# Patient Record
Sex: MALE | Race: WHITE
[De-identification: names, ages, dates, MRNs, and addresses within clinical notes are randomized per-mention and may not be internally consistent; named-entity substitution may affect disease eponyms.]

---

## 2020-01-08 PROBLEM — Z00.00 ENCOUNTER FOR PREVENTIVE HEALTH EXAMINATION: Status: ACTIVE | Noted: 2020-01-08

## 2020-01-09 ENCOUNTER — APPOINTMENT (OUTPATIENT)
Dept: NEUROLOGY | Facility: CLINIC | Age: 30
End: 2020-01-09
Payer: COMMERCIAL

## 2020-01-09 VITALS
SYSTOLIC BLOOD PRESSURE: 130 MMHG | HEIGHT: 69 IN | BODY MASS INDEX: 27.4 KG/M2 | HEART RATE: 64 BPM | WEIGHT: 185 LBS | OXYGEN SATURATION: 98 % | DIASTOLIC BLOOD PRESSURE: 80 MMHG | TEMPERATURE: 98.5 F

## 2020-01-09 DIAGNOSIS — Z82.3 FAMILY HISTORY OF STROKE: ICD-10-CM

## 2020-01-09 DIAGNOSIS — Z82.49 FAMILY HISTORY OF ISCHEMIC HEART DISEASE AND OTHER DISEASES OF THE CIRCULATORY SYSTEM: ICD-10-CM

## 2020-01-09 DIAGNOSIS — Z83.3 FAMILY HISTORY OF DIABETES MELLITUS: ICD-10-CM

## 2020-01-09 DIAGNOSIS — Z78.9 OTHER SPECIFIED HEALTH STATUS: ICD-10-CM

## 2020-01-09 PROCEDURE — 99204 OFFICE O/P NEW MOD 45 MIN: CPT

## 2020-01-09 NOTE — REVIEW OF SYSTEMS
[Confused or Disoriented] : confusion [Fever] : no fever [Chills] : no chills [Feeling Poorly] : not feeling poorly [Feeling Tired] : not feeling tired [Suicidal] : not suicidal [Sleep Disturbances] : no sleep disturbances [Anxiety] : no anxiety [Memory Lapses or Loss] : no memory loss [Depression] : no depression [Difficulty with Language] : no ~M difficulty with language [Changed Thought Patterns] : no change in thought patterns [Repeating Questions] : no repeated questioning about recent events [Facial Weakness] : no facial weakness [Arm Weakness] : no arm weakness [Hand Weakness] : no hand weakness [Leg Weakness] : no leg weakness [Poor Coordination] : good coordination [Difficulties in Speech] : no speech difficulties [Difficulty Writing] : no difficulty writing [Numbness] : no numbness [Tingling] : no tingling [Abnormal Sensation] : no abnormal sensation [Hypersensitivity] : no hypersensitivity [Cluster Headache] : no cluster headache [Migraine Headache] : no migraine headache [Tension Headache] : no tension-type headache [Difficulty Walking] : no difficulty walking [Inability to Walk] : able to walk [Ataxia] : no ataxia [Frequent Falls] : not falling [Limping] : not limping [Eye Pain] : no eye pain [Eyesight Problems] : no eyesight problems [Discharge From Eyes] : no purulent discharge from the eyes [Dry Eyes] : no dryness of the eyes [Eyes Itch] : no itching of the eyes [Earache] : no earache [Nosebleeds] : no nosebleeds [Loss Of Hearing] : no hearing loss [Nasal Discharge] : no nasal discharge [Abdominal Pain] : no abdominal pain [Vomiting] : no vomiting [Constipation] : no constipation [Dysuria] : no dysuria [Diarrhea] : no diarrhea [Arthralgias] : no arthralgias [Skin Lesions] : no skin lesions [Joint Pain] : no joint pain [Itching] : no itching [Skin Wound] : no skin wound [Change In A Mole] : no change in a mole [Proptosis] : no proptosis [Hot Flashes] : no hot flashes [Erectile Dysfunction] : no erectile dysfunction [Muscle Weakness] : no muscle weakness [Easy Bleeding] : no tendency for easy bleeding [Swollen Glands] : no swollen glands [Swollen Glands In The Neck] : no swollen glands in the neck

## 2020-01-09 NOTE — ASSESSMENT
[FreeTextEntry1] : KAYLIE MORRISON is a 29 year old M with no significant past medical history is here to be evaluated for episodes of tachycardia and confusional state and occasional chewing and lip smacking. Exam is unremarkable. There is no clear symptoms of seizure with no LOC, shaking, staring, tongue biting or incontinence, however seizure is still in the differential. Today we discussed the options and further studies. One option is admission to the hospital and VEEG monitoring. The first step however is to r/o cardiac event. He has an appointment with cardiologist next week. Meanwhile I would start the work up with routine EEG and CT head. \par \par - REEG\par - CT head w/o contrast \par - FU in 2-3 weeks after cardiac work up and EEG \par

## 2020-01-09 NOTE — PHYSICAL EXAM
[FreeTextEntry1] : Mental status: Awake, alert and oriented x3.  Recent and remote memory intact.  Naming, repetition and comprehension intact.  Attention/concentration intact.  No dysarthria, no aphasia.  Fund of knowledge appropriate.  \par Cranial nerves: Pupils equally round and reactive to light, visual fields full, no nystagmus, extraocular muscles intact, V1 through V3 intact bilaterally and symmetric, face symmetric, hearing intact to finger rub, palate elevation symmetric, tongue was midline.\par Motor:  MRC grading 5/5 b/l UE/LE.   strength 5/5.  Normal tone and bulk.  No abnormal movements.  \par Sensation: Intact to light touch, proprioception, and pinprick. \par Coordination: No dysmetria on finger-to-nose and heel-to-shin.  No dysdiadokinesia.\par Reflexes: 2+ in bilateral UE/LE, downgoing toes bilaterally. (-) Khanna.\par Gait: Narrow and steady. No ataxia.  Romberg negative\par \par

## 2020-01-09 NOTE — HISTORY OF PRESENT ILLNESS
[FreeTextEntry1] : KAYLIE MORRISON is a 29 year old M with no significant medical history who is here to be evaluated for episodes of tachycardia and confusion. He reports starting of the symptoms about a year ago. They were less frequent in the beginning and now happens 2-3 times a day. During the episodes he feels that his heart races fast for 5 seconds. During the intense episodes he becomes confused as he can not remember the location and his tasks for hew seconds. During some episodes his friends notices that he does chewing and lip smacking. He denies LOC, eye fluttering, body shaking or incontinence. He denies any history of head trauma, seizure in the past or seizure in the family. He works as a  and denies significant stress, depressed mood or poor sleep. He denies headache, dizziness, arm and leg weakness or numbness.  \par

## 2020-01-14 ENCOUNTER — APPOINTMENT (OUTPATIENT)
Dept: NEUROLOGY | Facility: CLINIC | Age: 30
End: 2020-01-14
Payer: COMMERCIAL

## 2020-01-14 PROCEDURE — 95816 EEG AWAKE AND DROWSY: CPT

## 2020-01-21 ENCOUNTER — OUTPATIENT (OUTPATIENT)
Dept: OUTPATIENT SERVICES | Facility: HOSPITAL | Age: 30
LOS: 1 days | Discharge: HOME | End: 2020-01-21
Payer: COMMERCIAL

## 2020-01-21 DIAGNOSIS — F44.89 OTHER DISSOCIATIVE AND CONVERSION DISORDERS: ICD-10-CM

## 2020-01-21 PROCEDURE — 70450 CT HEAD/BRAIN W/O DYE: CPT | Mod: 26

## 2020-02-11 ENCOUNTER — APPOINTMENT (OUTPATIENT)
Dept: NEUROLOGY | Facility: CLINIC | Age: 30
End: 2020-02-11
Payer: COMMERCIAL

## 2020-02-11 VITALS
TEMPERATURE: 98 F | WEIGHT: 184 LBS | HEART RATE: 62 BPM | DIASTOLIC BLOOD PRESSURE: 79 MMHG | SYSTOLIC BLOOD PRESSURE: 137 MMHG | HEIGHT: 69 IN | BODY MASS INDEX: 27.25 KG/M2 | OXYGEN SATURATION: 99 %

## 2020-02-11 DIAGNOSIS — R00.0 TACHYCARDIA, UNSPECIFIED: ICD-10-CM

## 2020-02-11 DIAGNOSIS — F44.89 OTHER DISSOCIATIVE AND CONVERSION DISORDERS: ICD-10-CM

## 2020-02-11 PROCEDURE — 99214 OFFICE O/P EST MOD 30 MIN: CPT

## 2020-02-11 NOTE — REVIEW OF SYSTEMS
[Fever] : no fever [Chills] : no chills [Feeling Poorly] : not feeling poorly [Feeling Tired] : not feeling tired [Suicidal] : not suicidal [Sleep Disturbances] : no sleep disturbances [Change In Personality] : no personality change [Anxiety] : anxiety [Emotional Problems] : emotional problems [Confused or Disoriented] : no confusion [Decr. Concentrating Ability] : no decrease in concentrating ability [Difficulty with Language] : no ~M difficulty with language [Memory Lapses or Loss] : memory loss [Changed Thought Patterns] : no change in thought patterns [Facial Weakness] : no facial weakness [Repeating Questions] : no repeated questioning about recent events [Arm Weakness] : no arm weakness [Hand Weakness] : no hand weakness [Leg Weakness] : no leg weakness [Poor Coordination] : good coordination [Difficulty Writing] : no difficulty writing [Difficulties in Speech] : no speech difficulties [Numbness] : no numbness [Tingling] : no tingling [Abnormal Sensation] : no abnormal sensation [Dizziness] : dizziness [Seizures] : no convulsions [Cluster Headache] : no cluster headache [Fainting] : fainting [Tension Headache] : no tension-type headache [Difficulty Walking] : no difficulty walking [Inability to Walk] : able to walk [Ataxia] : no ataxia [Frequent Falls] : not falling [Eye Pain] : no eye pain [Limping] : not limping [Red Eyes] : eyes not red [Heart Rate Is Fast] : fast heart rate [Heart Rate Is Slow] : the heart rate was not slow [Palpitations] : palpitations [Chest Pain] : chest pain [Wheezing] : no wheezing [Shortness Of Breath] : no shortness of breath [Abdominal Pain] : no abdominal pain [Dysuria] : no dysuria [Incontinence] : no incontinence [Hesitancy] : no urinary hesitancy [Arthralgias] : no arthralgias [Nocturia] : no nocturia [Joint Pain] : no joint pain [Skin Lesions] : no skin lesions

## 2020-02-11 NOTE — HISTORY OF PRESENT ILLNESS
[FreeTextEntry1] : KAYLIE MORRISON is a 29 year old M is here as a follow up visit for confusional state. He started to get  episodes of tachycardia and confusion about 3-4 times per day since last year. During the episodes he feels that his heart races fast for 5 seconds. During the intense episodes he becomes confused as he can not remember the location and his tasks for hew seconds. During some episodes his friends notices that he does chewing and lip smacking and has hand shaking. He denies LOC, eye fluttering, body shaking or incontinence. He denies any history of head trauma, seizure in the past or seizure in the family. Last visit routine EEG was ordered which was normal. CT head was unremarkable. He was seen by a cardiologist and reports normal cardiac work up. He still gets the episodes for about 3-4 times a day mostly early in the morning. He denies any triggers but sometimes stress makes it worse. \par

## 2020-02-11 NOTE — ASSESSMENT
[FreeTextEntry1] : KAYLIE MORRISON is a 29 year old M is here as a follow up for confusional states. He still gets the episodes for about 3-4 times a day. The episodes are described as palpitation, chest pain, sweating and sometimes confusion, shaking, lip smacking and chewing. Routine EEG and CT head are normal. The symptoms are suggestive of panic attack but to totally r/o the seizure will schedule him to ambulatory EEG. Meanwhile I refer him to psychiatry to assess the symptoms. \par \par - 72 hours ambulatory EEG  \par - Psychiatry referral\par - RTC after the results  \par

## 2020-02-21 ENCOUNTER — APPOINTMENT (OUTPATIENT)
Dept: NEUROLOGY | Facility: CLINIC | Age: 30
End: 2020-02-21

## 2020-02-24 ENCOUNTER — APPOINTMENT (OUTPATIENT)
Dept: NEUROLOGY | Facility: CLINIC | Age: 30
End: 2020-02-24

## 2020-02-28 ENCOUNTER — APPOINTMENT (OUTPATIENT)
Dept: NEUROLOGY | Facility: CLINIC | Age: 30
End: 2020-02-28
Payer: COMMERCIAL

## 2020-02-29 PROCEDURE — 95708 EEG WO VID EA 12-26HR UNMNTR: CPT

## 2020-03-01 PROCEDURE — 95708 EEG WO VID EA 12-26HR UNMNTR: CPT

## 2020-03-02 ENCOUNTER — APPOINTMENT (OUTPATIENT)
Dept: NEUROLOGY | Facility: CLINIC | Age: 30
End: 2020-03-02

## 2020-03-02 ENCOUNTER — TRANSCRIPTION ENCOUNTER (OUTPATIENT)
Age: 30
End: 2020-03-02

## 2020-03-02 PROCEDURE — 95708 EEG WO VID EA 12-26HR UNMNTR: CPT

## 2020-03-02 PROCEDURE — 95700 EEG CONT REC W/VID EEG TECH: CPT

## 2020-03-02 PROCEDURE — 95723 EEG PHY/QHP>60<84 HR W/O VID: CPT

## 2020-03-03 ENCOUNTER — APPOINTMENT (OUTPATIENT)
Dept: NEUROLOGY | Facility: CLINIC | Age: 30
End: 2020-03-03

## 2020-03-06 RX ORDER — LEVETIRACETAM 500 MG/1
500 TABLET, FILM COATED ORAL TWICE DAILY
Qty: 180 | Refills: 3 | Status: ACTIVE | COMMUNITY
Start: 2020-03-06 | End: 1900-01-01

## 2020-03-22 ENCOUNTER — RESULT REVIEW (OUTPATIENT)
Age: 30
End: 2020-03-22

## 2020-03-22 ENCOUNTER — OUTPATIENT (OUTPATIENT)
Dept: OUTPATIENT SERVICES | Facility: HOSPITAL | Age: 30
LOS: 1 days | Discharge: HOME | End: 2020-03-22
Payer: COMMERCIAL

## 2020-03-22 DIAGNOSIS — G40.109 LOCALIZATION-RELATED (FOCAL) (PARTIAL) SYMPTOMATIC EPILEPSY AND EPILEPTIC SYNDROMES WITH SIMPLE PARTIAL SEIZURES, NOT INTRACTABLE, WITHOUT STATUS EPILEPTICUS: ICD-10-CM

## 2020-03-22 PROCEDURE — 70553 MRI BRAIN STEM W/O & W/DYE: CPT | Mod: 26

## 2020-03-25 ENCOUNTER — APPOINTMENT (OUTPATIENT)
Dept: NEUROSURGERY | Facility: CLINIC | Age: 30
End: 2020-03-25
Payer: COMMERCIAL

## 2020-03-25 VITALS — BODY MASS INDEX: 27.7 KG/M2 | WEIGHT: 187 LBS | HEIGHT: 69 IN

## 2020-03-25 PROCEDURE — 99204 OFFICE O/P NEW MOD 45 MIN: CPT

## 2020-03-26 NOTE — HISTORY OF PRESENT ILLNESS
[FreeTextEntry1] : seizures [de-identified] : Mr. Nickerson is a komal 29 year-old right handed male with a history of seizures that started 3 years ago.  At the time, he describes the seizures as mild and infrequent.  He would get olfactory hallucinations then palpitations which he thought were panic attacks at the time.  For the past 3-4 months he has had the same episodes but he has no recollection of them.  They are more intense and more frequent, 3-4 times per day.  EEG confirmed seizure activity.  He was started on keppra, currently raised to 750mg bid 5 days ago.  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

## 2020-03-26 NOTE — PLAN
[FreeTextEntry1] : I discussed the MRI and clinical findings with Mr. Nickerson in detail.  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  Less likely malignant transformation of LGG.  I recommend an MRI brain with and without contrast with brainlab protocol in 4 weeks.  We will have a comparison study.  Nevertheless, I believe Mr. Nickerson should have surgery.  Resection would give us a pathologic diagnosis as well as potentially cure him of seizures.  However, a follow-up scan will give us an idea of its behavior and therefore the urgency.

## 2020-04-27 ENCOUNTER — RESULT REVIEW (OUTPATIENT)
Age: 30
End: 2020-04-27

## 2020-04-27 ENCOUNTER — OUTPATIENT (OUTPATIENT)
Dept: OUTPATIENT SERVICES | Facility: HOSPITAL | Age: 30
LOS: 1 days | Discharge: HOME | End: 2020-04-27
Payer: COMMERCIAL

## 2020-04-27 DIAGNOSIS — G93.89 OTHER SPECIFIED DISORDERS OF BRAIN: ICD-10-CM

## 2020-04-27 PROCEDURE — 70553 MRI BRAIN STEM W/O & W/DYE: CPT | Mod: 26

## 2020-04-29 ENCOUNTER — APPOINTMENT (OUTPATIENT)
Dept: NEUROSURGERY | Facility: CLINIC | Age: 30
End: 2020-04-29
Payer: COMMERCIAL

## 2020-04-29 PROCEDURE — 99214 OFFICE O/P EST MOD 30 MIN: CPT

## 2020-04-29 NOTE — HISTORY OF PRESENT ILLNESS
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 \par \par In the interim, Kulwinder had a 4 week interval MRI which showed no change.  He also had his keppra increased to 1000mg bid.  He is still having breakthrough seizures.

## 2020-04-29 NOTE — ASSESSMENT
[FreeTextEntry1] : I discussed the imaging and clinical findings with Kulwinder in detail.  I believe he should have this temporal lobe lesion removed, with the hope that it would treat his seizures and he would not require as high doses of antiepileptics or none at all.  I recommend a right pterional craniotomy and resection.  I discussed risks and benefits at length and he wishes to proceed.

## 2020-05-07 ENCOUNTER — APPOINTMENT (OUTPATIENT)
Dept: NEUROLOGY | Facility: CLINIC | Age: 30
End: 2020-05-07

## 2020-05-29 ENCOUNTER — OUTPATIENT (OUTPATIENT)
Dept: OUTPATIENT SERVICES | Facility: HOSPITAL | Age: 30
LOS: 1 days | Discharge: HOME | End: 2020-05-29
Payer: COMMERCIAL

## 2020-05-29 ENCOUNTER — RESULT REVIEW (OUTPATIENT)
Age: 30
End: 2020-05-29

## 2020-05-29 VITALS
WEIGHT: 191.8 LBS | TEMPERATURE: 97 F | RESPIRATION RATE: 16 BRPM | DIASTOLIC BLOOD PRESSURE: 78 MMHG | SYSTOLIC BLOOD PRESSURE: 137 MMHG | OXYGEN SATURATION: 97 % | HEIGHT: 69 IN | HEART RATE: 64 BPM

## 2020-05-29 DIAGNOSIS — G93.89 OTHER SPECIFIED DISORDERS OF BRAIN: ICD-10-CM

## 2020-05-29 DIAGNOSIS — Z01.818 ENCOUNTER FOR OTHER PREPROCEDURAL EXAMINATION: ICD-10-CM

## 2020-05-29 LAB
ALBUMIN SERPL ELPH-MCNC: 4.9 G/DL — SIGNIFICANT CHANGE UP (ref 3.5–5.2)
ALP SERPL-CCNC: 68 U/L — SIGNIFICANT CHANGE UP (ref 30–115)
ALT FLD-CCNC: 34 U/L — SIGNIFICANT CHANGE UP (ref 0–41)
ANION GAP SERPL CALC-SCNC: 12 MMOL/L — SIGNIFICANT CHANGE UP (ref 7–14)
APTT BLD: 31.2 SEC — SIGNIFICANT CHANGE UP (ref 27–39.2)
AST SERPL-CCNC: 22 U/L — SIGNIFICANT CHANGE UP (ref 0–41)
BASOPHILS # BLD AUTO: 0.04 K/UL — SIGNIFICANT CHANGE UP (ref 0–0.2)
BASOPHILS NFR BLD AUTO: 0.8 % — SIGNIFICANT CHANGE UP (ref 0–1)
BILIRUB SERPL-MCNC: 0.3 MG/DL — SIGNIFICANT CHANGE UP (ref 0.2–1.2)
BLD GP AB SCN SERPL QL: SIGNIFICANT CHANGE UP
BUN SERPL-MCNC: 12 MG/DL — SIGNIFICANT CHANGE UP (ref 10–20)
CALCIUM SERPL-MCNC: 9.5 MG/DL — SIGNIFICANT CHANGE UP (ref 8.5–10.1)
CHLORIDE SERPL-SCNC: 102 MMOL/L — SIGNIFICANT CHANGE UP (ref 98–110)
CO2 SERPL-SCNC: 28 MMOL/L — SIGNIFICANT CHANGE UP (ref 17–32)
CREAT SERPL-MCNC: 0.9 MG/DL — SIGNIFICANT CHANGE UP (ref 0.7–1.5)
EOSINOPHIL # BLD AUTO: 0.07 K/UL — SIGNIFICANT CHANGE UP (ref 0–0.7)
EOSINOPHIL NFR BLD AUTO: 1.5 % — SIGNIFICANT CHANGE UP (ref 0–8)
GLUCOSE SERPL-MCNC: 80 MG/DL — SIGNIFICANT CHANGE UP (ref 70–99)
HCT VFR BLD CALC: 47.3 % — SIGNIFICANT CHANGE UP (ref 42–52)
HGB BLD-MCNC: 16.2 G/DL — SIGNIFICANT CHANGE UP (ref 14–18)
IMM GRANULOCYTES NFR BLD AUTO: 0.4 % — HIGH (ref 0.1–0.3)
INR BLD: 0.96 RATIO — SIGNIFICANT CHANGE UP (ref 0.65–1.3)
LYMPHOCYTES # BLD AUTO: 1.54 K/UL — SIGNIFICANT CHANGE UP (ref 1.2–3.4)
LYMPHOCYTES # BLD AUTO: 32.2 % — SIGNIFICANT CHANGE UP (ref 20.5–51.1)
MCHC RBC-ENTMCNC: 28.6 PG — SIGNIFICANT CHANGE UP (ref 27–31)
MCHC RBC-ENTMCNC: 34.2 G/DL — SIGNIFICANT CHANGE UP (ref 32–37)
MCV RBC AUTO: 83.4 FL — SIGNIFICANT CHANGE UP (ref 80–94)
MONOCYTES # BLD AUTO: 0.3 K/UL — SIGNIFICANT CHANGE UP (ref 0.1–0.6)
MONOCYTES NFR BLD AUTO: 6.3 % — SIGNIFICANT CHANGE UP (ref 1.7–9.3)
NEUTROPHILS # BLD AUTO: 2.81 K/UL — SIGNIFICANT CHANGE UP (ref 1.4–6.5)
NEUTROPHILS NFR BLD AUTO: 58.8 % — SIGNIFICANT CHANGE UP (ref 42.2–75.2)
NRBC # BLD: 0 /100 WBCS — SIGNIFICANT CHANGE UP (ref 0–0)
PLATELET # BLD AUTO: 259 K/UL — SIGNIFICANT CHANGE UP (ref 130–400)
POTASSIUM SERPL-MCNC: 4.7 MMOL/L — SIGNIFICANT CHANGE UP (ref 3.5–5)
POTASSIUM SERPL-SCNC: 4.7 MMOL/L — SIGNIFICANT CHANGE UP (ref 3.5–5)
PROT SERPL-MCNC: 7.5 G/DL — SIGNIFICANT CHANGE UP (ref 6–8)
PROTHROM AB SERPL-ACNC: 11 SEC — SIGNIFICANT CHANGE UP (ref 9.95–12.87)
RBC # BLD: 5.67 M/UL — SIGNIFICANT CHANGE UP (ref 4.7–6.1)
RBC # FLD: 12.6 % — SIGNIFICANT CHANGE UP (ref 11.5–14.5)
SODIUM SERPL-SCNC: 142 MMOL/L — SIGNIFICANT CHANGE UP (ref 135–146)
WBC # BLD: 4.78 K/UL — LOW (ref 4.8–10.8)
WBC # FLD AUTO: 4.78 K/UL — LOW (ref 4.8–10.8)

## 2020-05-29 PROCEDURE — 71046 X-RAY EXAM CHEST 2 VIEWS: CPT | Mod: 26

## 2020-05-29 NOTE — H&P PST ADULT - REASON FOR ADMISSION
29 yo male presents for PAST in preparation for right pterional craniotomy removal of mass on 6/5/2020

## 2020-05-29 NOTE — H&P PST ADULT - HISTORY OF PRESENT ILLNESS
Pt has brain mass that is causing seizures. Denies any chest pain, difficulty breathing, SOB, palpitations, dysuria, URI, or any other infections in the last 2 weeks. Denies any recent travel, contact, or exposure to any persons with known or suspected COVID-19. Pt states he had COVID antibody testing done within the last 2 weeks which came back positive but denies experiencing any symptoms. Exercise tolerance of 3-4 flights of stairs without dyspnea. YARELY reviewed with patient.

## 2020-06-01 PROBLEM — D49.6 NEOPLASM OF UNSPECIFIED BEHAVIOR OF BRAIN: Chronic | Status: ACTIVE | Noted: 2020-05-29

## 2020-06-01 PROBLEM — R56.9 UNSPECIFIED CONVULSIONS: Chronic | Status: ACTIVE | Noted: 2020-05-29

## 2020-06-04 ENCOUNTER — APPOINTMENT (OUTPATIENT)
Dept: NEUROLOGY | Facility: CLINIC | Age: 30
End: 2020-06-04

## 2020-06-13 ENCOUNTER — LABORATORY RESULT (OUTPATIENT)
Age: 30
End: 2020-06-13

## 2020-06-16 ENCOUNTER — RESULT REVIEW (OUTPATIENT)
Age: 30
End: 2020-06-16

## 2020-06-16 ENCOUNTER — INPATIENT (INPATIENT)
Facility: HOSPITAL | Age: 30
LOS: 1 days | Discharge: HOME HEALTH | End: 2020-06-18
Attending: NEUROLOGICAL SURGERY | Admitting: NEUROLOGICAL SURGERY
Payer: COMMERCIAL

## 2020-06-16 VITALS
HEART RATE: 79 BPM | TEMPERATURE: 98 F | DIASTOLIC BLOOD PRESSURE: 72 MMHG | RESPIRATION RATE: 20 BRPM | SYSTOLIC BLOOD PRESSURE: 129 MMHG

## 2020-06-16 DIAGNOSIS — G40.919 EPILEPSY, UNSPECIFIED, INTRACTABLE, WITHOUT STATUS EPILEPTICUS: ICD-10-CM

## 2020-06-16 LAB
ALBUMIN SERPL ELPH-MCNC: 4 G/DL — SIGNIFICANT CHANGE UP (ref 3.5–5.2)
ALP SERPL-CCNC: 56 U/L — SIGNIFICANT CHANGE UP (ref 30–115)
ALT FLD-CCNC: 27 U/L — SIGNIFICANT CHANGE UP (ref 0–41)
ANION GAP SERPL CALC-SCNC: 12 MMOL/L — SIGNIFICANT CHANGE UP (ref 7–14)
APTT BLD: 29.3 SEC — SIGNIFICANT CHANGE UP (ref 27–39.2)
AST SERPL-CCNC: 20 U/L — SIGNIFICANT CHANGE UP (ref 0–41)
BASOPHILS # BLD AUTO: 0.01 K/UL — SIGNIFICANT CHANGE UP (ref 0–0.2)
BASOPHILS # BLD AUTO: 0.01 K/UL — SIGNIFICANT CHANGE UP (ref 0–0.2)
BASOPHILS NFR BLD AUTO: 0.1 % — SIGNIFICANT CHANGE UP (ref 0–1)
BASOPHILS NFR BLD AUTO: 0.1 % — SIGNIFICANT CHANGE UP (ref 0–1)
BILIRUB SERPL-MCNC: 0.4 MG/DL — SIGNIFICANT CHANGE UP (ref 0.2–1.2)
BLD GP AB SCN SERPL QL: SIGNIFICANT CHANGE UP
BUN SERPL-MCNC: 11 MG/DL — SIGNIFICANT CHANGE UP (ref 10–20)
CALCIUM SERPL-MCNC: 8.1 MG/DL — LOW (ref 8.5–10.1)
CHLORIDE SERPL-SCNC: 105 MMOL/L — SIGNIFICANT CHANGE UP (ref 98–110)
CK MB CFR SERPL CALC: 3.1 NG/ML — SIGNIFICANT CHANGE UP (ref 0.6–6.3)
CK MB CFR SERPL CALC: 6.3 NG/ML — SIGNIFICANT CHANGE UP (ref 0.6–6.3)
CK SERPL-CCNC: 1892 U/L — HIGH (ref 0–225)
CK SERPL-CCNC: 601 U/L — HIGH (ref 0–225)
CO2 SERPL-SCNC: 22 MMOL/L — SIGNIFICANT CHANGE UP (ref 17–32)
CREAT SERPL-MCNC: 0.8 MG/DL — SIGNIFICANT CHANGE UP (ref 0.7–1.5)
EOSINOPHIL # BLD AUTO: 0 K/UL — SIGNIFICANT CHANGE UP (ref 0–0.7)
EOSINOPHIL # BLD AUTO: 0 K/UL — SIGNIFICANT CHANGE UP (ref 0–0.7)
EOSINOPHIL NFR BLD AUTO: 0 % — SIGNIFICANT CHANGE UP (ref 0–8)
EOSINOPHIL NFR BLD AUTO: 0 % — SIGNIFICANT CHANGE UP (ref 0–8)
GLUCOSE BLDC GLUCOMTR-MCNC: 134 MG/DL — HIGH (ref 70–99)
GLUCOSE BLDC GLUCOMTR-MCNC: 156 MG/DL — HIGH (ref 70–99)
GLUCOSE SERPL-MCNC: 170 MG/DL — HIGH (ref 70–99)
HCT VFR BLD CALC: 39.5 % — LOW (ref 42–52)
HCT VFR BLD CALC: 39.7 % — LOW (ref 42–52)
HGB BLD-MCNC: 13.6 G/DL — LOW (ref 14–18)
HGB BLD-MCNC: 13.8 G/DL — LOW (ref 14–18)
IMM GRANULOCYTES NFR BLD AUTO: 0.3 % — SIGNIFICANT CHANGE UP (ref 0.1–0.3)
IMM GRANULOCYTES NFR BLD AUTO: 0.3 % — SIGNIFICANT CHANGE UP (ref 0.1–0.3)
INR BLD: 1.16 RATIO — SIGNIFICANT CHANGE UP (ref 0.65–1.3)
LYMPHOCYTES # BLD AUTO: 0.5 K/UL — LOW (ref 1.2–3.4)
LYMPHOCYTES # BLD AUTO: 0.64 K/UL — LOW (ref 1.2–3.4)
LYMPHOCYTES # BLD AUTO: 4.3 % — LOW (ref 20.5–51.1)
LYMPHOCYTES # BLD AUTO: 6.5 % — LOW (ref 20.5–51.1)
MAGNESIUM SERPL-MCNC: 1.5 MG/DL — LOW (ref 1.8–2.4)
MCHC RBC-ENTMCNC: 28.3 PG — SIGNIFICANT CHANGE UP (ref 27–31)
MCHC RBC-ENTMCNC: 28.6 PG — SIGNIFICANT CHANGE UP (ref 27–31)
MCHC RBC-ENTMCNC: 34.4 G/DL — SIGNIFICANT CHANGE UP (ref 32–37)
MCHC RBC-ENTMCNC: 34.8 G/DL — SIGNIFICANT CHANGE UP (ref 32–37)
MCV RBC AUTO: 81.5 FL — SIGNIFICANT CHANGE UP (ref 80–94)
MCV RBC AUTO: 83 FL — SIGNIFICANT CHANGE UP (ref 80–94)
MONOCYTES # BLD AUTO: 0.05 K/UL — LOW (ref 0.1–0.6)
MONOCYTES # BLD AUTO: 0.61 K/UL — HIGH (ref 0.1–0.6)
MONOCYTES NFR BLD AUTO: 0.6 % — LOW (ref 1.7–9.3)
MONOCYTES NFR BLD AUTO: 4.1 % — SIGNIFICANT CHANGE UP (ref 1.7–9.3)
NEUTROPHILS # BLD AUTO: 13.74 K/UL — HIGH (ref 1.4–6.5)
NEUTROPHILS # BLD AUTO: 7.16 K/UL — HIGH (ref 1.4–6.5)
NEUTROPHILS NFR BLD AUTO: 91.2 % — HIGH (ref 42.2–75.2)
NEUTROPHILS NFR BLD AUTO: 92.5 % — HIGH (ref 42.2–75.2)
NRBC # BLD: 0 /100 WBCS — SIGNIFICANT CHANGE UP (ref 0–0)
NRBC # BLD: 0 /100 WBCS — SIGNIFICANT CHANGE UP (ref 0–0)
PHOSPHATE SERPL-MCNC: 2.9 MG/DL — SIGNIFICANT CHANGE UP (ref 2.1–4.9)
PLATELET # BLD AUTO: 224 K/UL — SIGNIFICANT CHANGE UP (ref 130–400)
PLATELET # BLD AUTO: 233 K/UL — SIGNIFICANT CHANGE UP (ref 130–400)
POTASSIUM SERPL-MCNC: 4.6 MMOL/L — SIGNIFICANT CHANGE UP (ref 3.5–5)
POTASSIUM SERPL-SCNC: 4.6 MMOL/L — SIGNIFICANT CHANGE UP (ref 3.5–5)
PROT SERPL-MCNC: 5.7 G/DL — LOW (ref 6–8)
PROTHROM AB SERPL-ACNC: 13.3 SEC — HIGH (ref 9.95–12.87)
RBC # BLD: 4.76 M/UL — SIGNIFICANT CHANGE UP (ref 4.7–6.1)
RBC # BLD: 4.87 M/UL — SIGNIFICANT CHANGE UP (ref 4.7–6.1)
RBC # FLD: 12.1 % — SIGNIFICANT CHANGE UP (ref 11.5–14.5)
RBC # FLD: 12.2 % — SIGNIFICANT CHANGE UP (ref 11.5–14.5)
SODIUM SERPL-SCNC: 139 MMOL/L — SIGNIFICANT CHANGE UP (ref 135–146)
TROPONIN T SERPL-MCNC: <0.01 NG/ML — SIGNIFICANT CHANGE UP
TROPONIN T SERPL-MCNC: <0.01 NG/ML — SIGNIFICANT CHANGE UP
WBC # BLD: 15.05 K/UL — HIGH (ref 4.8–10.8)
WBC # BLD: 7.74 K/UL — SIGNIFICANT CHANGE UP (ref 4.8–10.8)
WBC # FLD AUTO: 15.05 K/UL — HIGH (ref 4.8–10.8)
WBC # FLD AUTO: 7.74 K/UL — SIGNIFICANT CHANGE UP (ref 4.8–10.8)

## 2020-06-16 PROCEDURE — 99223 1ST HOSP IP/OBS HIGH 75: CPT | Mod: 57

## 2020-06-16 PROCEDURE — 71045 X-RAY EXAM CHEST 1 VIEW: CPT | Mod: 26

## 2020-06-16 PROCEDURE — 93010 ELECTROCARDIOGRAM REPORT: CPT

## 2020-06-16 PROCEDURE — 14040 TIS TRNFR F/C/C/M/N/A/G/H/F: CPT

## 2020-06-16 PROCEDURE — 88312 SPECIAL STAINS GROUP 1: CPT | Mod: 26

## 2020-06-16 PROCEDURE — 88313 SPECIAL STAINS GROUP 2: CPT | Mod: 26

## 2020-06-16 PROCEDURE — 93010 ELECTROCARDIOGRAM REPORT: CPT | Mod: 77

## 2020-06-16 PROCEDURE — 61510 CRNEC TREPH EXC BRN TUM STTL: CPT | Mod: AS

## 2020-06-16 PROCEDURE — 69990 MICROSURGERY ADD-ON: CPT | Mod: 59

## 2020-06-16 PROCEDURE — 70450 CT HEAD/BRAIN W/O DYE: CPT | Mod: 26

## 2020-06-16 PROCEDURE — 88342 IMHCHEM/IMCYTCHM 1ST ANTB: CPT | Mod: 26,59

## 2020-06-16 PROCEDURE — 61510 CRNEC TREPH EXC BRN TUM STTL: CPT

## 2020-06-16 PROCEDURE — 61781 SCAN PROC CRANIAL INTRA: CPT

## 2020-06-16 PROCEDURE — 88341 IMHCHEM/IMCYTCHM EA ADD ANTB: CPT | Mod: 26,59

## 2020-06-16 PROCEDURE — 88360 TUMOR IMMUNOHISTOCHEM/MANUAL: CPT | Mod: 26,59

## 2020-06-16 PROCEDURE — 88307 TISSUE EXAM BY PATHOLOGIST: CPT | Mod: 26

## 2020-06-16 RX ORDER — ESLICARBAZEPINE ACETATE 800 MG/1
1000 TABLET ORAL
Qty: 0 | Refills: 0 | DISCHARGE

## 2020-06-16 RX ORDER — OXYCODONE AND ACETAMINOPHEN 5; 325 MG/1; MG/1
2 TABLET ORAL ONCE
Refills: 0 | Status: DISCONTINUED | OUTPATIENT
Start: 2020-06-16 | End: 2020-06-16

## 2020-06-16 RX ORDER — ONDANSETRON 8 MG/1
4 TABLET, FILM COATED ORAL EVERY 6 HOURS
Refills: 0 | Status: DISCONTINUED | OUTPATIENT
Start: 2020-06-16 | End: 2020-06-18

## 2020-06-16 RX ORDER — LEVETIRACETAM 250 MG/1
1000 TABLET, FILM COATED ORAL
Refills: 0 | Status: DISCONTINUED | OUTPATIENT
Start: 2020-06-16 | End: 2020-06-18

## 2020-06-16 RX ORDER — SENNA PLUS 8.6 MG/1
2 TABLET ORAL AT BEDTIME
Refills: 0 | Status: DISCONTINUED | OUTPATIENT
Start: 2020-06-16 | End: 2020-06-18

## 2020-06-16 RX ORDER — MAGNESIUM SULFATE 500 MG/ML
2 VIAL (ML) INJECTION ONCE
Refills: 0 | Status: COMPLETED | OUTPATIENT
Start: 2020-06-16 | End: 2020-06-16

## 2020-06-16 RX ORDER — DEXAMETHASONE 0.5 MG/5ML
4 ELIXIR ORAL EVERY 6 HOURS
Refills: 0 | Status: DISCONTINUED | OUTPATIENT
Start: 2020-06-16 | End: 2020-06-17

## 2020-06-16 RX ORDER — ACETAMINOPHEN 500 MG
650 TABLET ORAL EVERY 4 HOURS
Refills: 0 | Status: DISCONTINUED | OUTPATIENT
Start: 2020-06-16 | End: 2020-06-18

## 2020-06-16 RX ORDER — MORPHINE SULFATE 50 MG/1
2 CAPSULE, EXTENDED RELEASE ORAL
Refills: 0 | Status: DISCONTINUED | OUTPATIENT
Start: 2020-06-16 | End: 2020-06-16

## 2020-06-16 RX ORDER — ONDANSETRON 8 MG/1
4 TABLET, FILM COATED ORAL ONCE
Refills: 0 | Status: COMPLETED | OUTPATIENT
Start: 2020-06-16 | End: 2020-06-16

## 2020-06-16 RX ORDER — OXYCODONE AND ACETAMINOPHEN 5; 325 MG/1; MG/1
2 TABLET ORAL EVERY 6 HOURS
Refills: 0 | Status: DISCONTINUED | OUTPATIENT
Start: 2020-06-16 | End: 2020-06-17

## 2020-06-16 RX ORDER — ACETAMINOPHEN 500 MG
1000 TABLET ORAL ONCE
Refills: 0 | Status: COMPLETED | OUTPATIENT
Start: 2020-06-16 | End: 2020-06-16

## 2020-06-16 RX ORDER — SODIUM CHLORIDE 9 MG/ML
1000 INJECTION INTRAMUSCULAR; INTRAVENOUS; SUBCUTANEOUS
Refills: 0 | Status: DISCONTINUED | OUTPATIENT
Start: 2020-06-16 | End: 2020-06-17

## 2020-06-16 RX ORDER — LEVETIRACETAM 250 MG/1
1 TABLET, FILM COATED ORAL
Qty: 0 | Refills: 0 | DISCHARGE

## 2020-06-16 RX ORDER — ESLICARBAZEPINE ACETATE 800 MG/1
1000 TABLET ORAL DAILY
Refills: 0 | Status: DISCONTINUED | OUTPATIENT
Start: 2020-06-16 | End: 2020-06-18

## 2020-06-16 RX ORDER — OXYCODONE AND ACETAMINOPHEN 5; 325 MG/1; MG/1
1 TABLET ORAL EVERY 4 HOURS
Refills: 0 | Status: DISCONTINUED | OUTPATIENT
Start: 2020-06-16 | End: 2020-06-18

## 2020-06-16 RX ORDER — PANTOPRAZOLE SODIUM 20 MG/1
40 TABLET, DELAYED RELEASE ORAL
Refills: 0 | Status: DISCONTINUED | OUTPATIENT
Start: 2020-06-16 | End: 2020-06-18

## 2020-06-16 RX ORDER — MORPHINE SULFATE 50 MG/1
4 CAPSULE, EXTENDED RELEASE ORAL
Refills: 0 | Status: DISCONTINUED | OUTPATIENT
Start: 2020-06-16 | End: 2020-06-17

## 2020-06-16 RX ORDER — SODIUM CHLORIDE 9 MG/ML
1000 INJECTION, SOLUTION INTRAVENOUS
Refills: 0 | Status: DISCONTINUED | OUTPATIENT
Start: 2020-06-16 | End: 2020-06-16

## 2020-06-16 RX ORDER — MORPHINE SULFATE 50 MG/1
4 CAPSULE, EXTENDED RELEASE ORAL
Refills: 0 | Status: DISCONTINUED | OUTPATIENT
Start: 2020-06-16 | End: 2020-06-16

## 2020-06-16 RX ADMIN — SODIUM CHLORIDE 75 MILLILITER(S): 9 INJECTION INTRAMUSCULAR; INTRAVENOUS; SUBCUTANEOUS at 15:00

## 2020-06-16 RX ADMIN — ONDANSETRON 4 MILLIGRAM(S): 8 TABLET, FILM COATED ORAL at 18:10

## 2020-06-16 RX ADMIN — Medication 400 MILLIGRAM(S): at 19:03

## 2020-06-16 RX ADMIN — SODIUM CHLORIDE 75 MILLILITER(S): 9 INJECTION, SOLUTION INTRAVENOUS at 13:42

## 2020-06-16 RX ADMIN — MORPHINE SULFATE 4 MILLIGRAM(S): 50 CAPSULE, EXTENDED RELEASE ORAL at 14:00

## 2020-06-16 RX ADMIN — SENNA PLUS 2 TABLET(S): 8.6 TABLET ORAL at 22:42

## 2020-06-16 RX ADMIN — ONDANSETRON 4 MILLIGRAM(S): 8 TABLET, FILM COATED ORAL at 20:30

## 2020-06-16 RX ADMIN — LEVETIRACETAM 1000 MILLIGRAM(S): 250 TABLET, FILM COATED ORAL at 18:50

## 2020-06-16 RX ADMIN — Medication 50 GRAM(S): at 19:45

## 2020-06-16 RX ADMIN — Medication 4 MILLIGRAM(S): at 18:19

## 2020-06-16 NOTE — PROGRESS NOTE ADULT - SUBJECTIVE AND OBJECTIVE BOX
NEUROSURGERY POST OP NOTE:    s/p  right crani for removal of temporal mass    OR time:  7.5h    EBL: 500cc      UO: 300cc            IV Fluids: 5L (2 LR, 3 NS)      Blood Products: none (intra-op ABG hb 13)       Pt resting comfortably in RR. mild incisional pain that is well controlled with morphine. No dizziness or blurry vision.    Vital Signs Last 24 Hrs  T(C): 36.9 (16 Jun 2020 14:41), Max: 36.9 (16 Jun 2020 14:41)  T(F): 98.4 (16 Jun 2020 14:41), Max: 98.4 (16 Jun 2020 14:41)  HR: 77 (16 Jun 2020 16:00) (65 - 100)  BP: 117/59 (16 Jun 2020 16:00) (115/62 - 137/65)  BP(mean): --  RR: 13 (16 Jun 2020 16:00) (13 - 21)  SpO2: 97% (16 Jun 2020 16:00) (97% - 99%)      06-16-20 @ 07:01  -  06-16-20 @ 16:22  --------------------------------------------------------  IN: 150 mL / OUT: 475 mL / NET: -325 mL        acetaminophen   Tablet .. 650 milliGRAM(s) Oral every 4 hours PRN  acetaminophen  IVPB .. 1000 milliGRAM(s) IV Intermittent once  dexAMETHasone  Injectable 4 milliGRAM(s) IV Push every 6 hours  eslicarbazepine 1000 milliGRAM(s) Oral daily  levETIRAcetam 1000 milliGRAM(s) Oral two times a day  morphine  - Injectable 4 milliGRAM(s) IV Push every 10 minutes PRN  morphine  - Injectable 2 milliGRAM(s) IV Push every 10 minutes PRN  morphine  - Injectable 4 milliGRAM(s) IV Push every 3 hours PRN  ondansetron Injectable 4 milliGRAM(s) IV Push once PRN  ondansetron Injectable 4 milliGRAM(s) IV Push every 6 hours PRN  oxycodone    5 mG/acetaminophen 325 mG 1 Tablet(s) Oral every 4 hours PRN  oxycodone    5 mG/acetaminophen 325 mG 2 Tablet(s) Oral every 6 hours PRN  oxycodone    5 mG/acetaminophen 325 mG 2 Tablet(s) Oral once PRN  pantoprazole    Tablet 40 milliGRAM(s) Oral before breakfast  senna 2 Tablet(s) Oral at bedtime  sodium chloride 0.9%. 1000 milliLiter(s) IV Continuous <Continuous>      Exam:  AAOX3. Verbal function intact  tongue midline, facial motions symmetric  PERRL, EOMI  Motor: MAEx4, 5/5 power in b/l UE and LE  Sensation: intact to touch in all extremities      WOUND/DRAINS:  crani dressing clean, dry and intact      Plan:  head ct tonight  neuro checks q1h  pt/rehab [facility]  may eat after head ct if stable  keppra home dose  icu monitoring  d/w attending

## 2020-06-16 NOTE — CONSULT NOTE ADULT - ATTENDING COMMENTS
I personally examined this patient with the PA and resident and discussed my plan with them.    pt is s/p crani for mass in setting of seizures, post-op pain but awake, alert  risk of neuro deterioration, q1h neurologic exams

## 2020-06-16 NOTE — CONSULT NOTE ADULT - ASSESSMENT
ASSESSMENT/PLAN: 29yo Male admitted to SICU for q1h neuro checks s/p craniotomy with R temporal mass resection.       Neurologic:    Respiratory:    Cardiovascular:    Gastrointestinal/Nutrition:    Genitourinary/Renal:    Hematologic:    Infectious Disease:    Endocrine:    Disposition: ASSESSMENT/PLAN: 31yo Male admitted to SICU for q1h neuro checks s/p craniotomy with R temporal mass resection.       Neurologic:  s/p craniotomy  hx of seizures  give home meds  q1h neuro checks  neuro intact post-op  post op head ct    Respiratory:  2L NC, normal O2 sat, wean as tolerated  post op CXR  no active disease    Cardiovascular:  No active disease  A line  No hemodynamic instability intra-op  Monitor HR, BP  f/u post op EKG, [place]    Gastrointestinal/Nutrition:  PPI, Senna  No active disease  NPO until post op head CT    Genitourinary/Renal:  Wells  TOV after post op head ct  no active disease  f/u post op labs    Hematologic:  500 cc EBL intra-op  ABG Hb wnl intra-op  f/u post op labs  f/u post op head ct for possible AC    Infectious Disease:  no active disease  f/u wbc, temperature, signs/sxs of infection    Endocrine:  no active disease  POCT blood glucose when NPO    Disposition: SICU

## 2020-06-16 NOTE — CONSULT NOTE ADULT - SUBJECTIVE AND OBJECTIVE BOX
SICU Consultation Note  =====================================================  HPI:   30y male      Surgery Information  OR time:  7.5h    EBL: 500cc      UO: 300cc            IV Fluids: 5L (2 LR, 3 NS)      Blood Products: none (intra-op ABG hb 13)           PAST MEDICAL & SURGICAL HISTORY:  Brain tumor  Seizure: last activity a week ago  No significant past surgical history    HPI: 31 yo M w/hx of seizures has hx of seizures. Started 3 years ago. Had MRI 6 months ago showing mass in R temporal lobe. Went for craniotomy and mass resection today.    Hemodynamically stable throughout case, no pressors needed. No mannitol given. Given Ancef 2g, Decadron 12mg. Took 1000mg Keppra this morning (takes 1000mg BID), received 500 mg IV by anesthesia as well. Received in PACU states he has pain at incision site but controlled with morphine. No other complaints.     Allergies    No Known Allergies    Intolerances      SH:  Home Medications:  Aptiom: 1000 milligram(s) orally once a day (at bedtime) (16 Jun 2020 07:04)  levETIRAcetam 1000 mg oral tablet: 1 tab(s) orally 2 times a day (16 Jun 2020 07:04)    Advanced Directives: Full Code     ROS:  [ ] A ten-point review of systems was otherwise negative except as noted.  [ ] Due to altered mental status/intubation, subjective information were not able to be obtained from the patient. History was obtained, to the extent possible, from review of the chart and collateral sources of information.      CURRENT MEDICATIONS:   --------------------------------------------------------------------------------------  Neurologic Medications  acetaminophen   Tablet .. 650 milliGRAM(s) Oral every 4 hours PRN Temp greater or equal to 38C (100.4F), Mild Pain (1 - 3)  acetaminophen  IVPB .. 1000 milliGRAM(s) IV Intermittent once  eslicarbazepine 1000 milliGRAM(s) Oral daily  levETIRAcetam 1000 milliGRAM(s) Oral two times a day  morphine  - Injectable 4 milliGRAM(s) IV Push every 10 minutes PRN Severe Pain (7 - 10)  morphine  - Injectable 2 milliGRAM(s) IV Push every 10 minutes PRN Moderate Pain (4 - 6)  morphine  - Injectable 4 milliGRAM(s) IV Push every 3 hours PRN severe breakthrough pain  ondansetron Injectable 4 milliGRAM(s) IV Push once PRN Nausea and/or Vomiting  ondansetron Injectable 4 milliGRAM(s) IV Push every 6 hours PRN Nausea and/or Vomiting  oxycodone    5 mG/acetaminophen 325 mG 1 Tablet(s) Oral every 4 hours PRN Moderate Pain (4 - 6)  oxycodone    5 mG/acetaminophen 325 mG 2 Tablet(s) Oral every 6 hours PRN Severe Pain (7 - 10)  oxycodone    5 mG/acetaminophen 325 mG 2 Tablet(s) Oral once PRN Severe Pain (7 - 10)    Respiratory Medications    Cardiovascular Medications    Gastrointestinal Medications  pantoprazole    Tablet 40 milliGRAM(s) Oral before breakfast  senna 2 Tablet(s) Oral at bedtime  sodium chloride 0.9%. 1000 milliLiter(s) IV Continuous <Continuous>    Genitourinary Medications    Hematologic/Oncologic Medications    Antimicrobial/Immunologic Medications    Endocrine/Metabolic Medications  dexAMETHasone  Injectable 4 milliGRAM(s) IV Push every 6 hours    Topical/Other Medications    --------------------------------------------------------------------------------------    Vital Signs Last 24 Hrs  T(C): 36.7 (16 Jun 2020 13:41), Max: 36.7 (16 Jun 2020 13:41)  T(F): 98.1 (16 Jun 2020 13:41), Max: 98.1 (16 Jun 2020 13:41)  HR: 93 (16 Jun 2020 14:11) (77 - 100)  BP: 121/70 (16 Jun 2020 14:11) (120/67 - 129/72)  BP(mean): --  RR: 18 (16 Jun 2020 14:11) (14 - 21)  SpO2: 99% (16 Jun 2020 14:11) (98% - 99%)  I&O's Detail    I&O's Summary      LABS:  ABG - ( 16 Jun 2020 10:01 )  pH, Arterial: 7.42  pH, Blood: x     /  pCO2: 35    /  pO2: 252   / HCO3: 23    / Base Excess: -1.0  /  SaO2: 100                                 EXAM:  General/Neuro  GCS:   Exam: Normal, NAD, alert, oriented x 3, no focal deficits. CN 2-12 in tact, no dysmetria, power 5/5 and sensation intact to light touch in all 4 extremities    Respiratory  Exam: Lungs clear to auscultation, Normal expansion/effort.  ***  [] Tracheostomy   [] Intubated  Mechanical Ventilation:     Cardiovascular  Exam: S1, S2.  Regular rate and rhythm.   Cardiac Rhythm: Normal Sinus Rhythm  ECHO:     GI  Exam: Abdomen soft, Non-tender, Non-distended.  Gastrostomy / Jejunostomy tube in place.  Nasogastric tube in place.  Colostomy / Ileostomy.  ***  Wound:   ***  Current Diet:  NPO***    Extremities  Exam: Extremities warm, pink, well-perfused.   Peripheral edema    Derm:  Exam: Good skin turgor, no skin breakdown.      :   Exam: Wells catheter in place.     Tubes/Lines/Drains  ***  [x] Peripheral IV  [] Central Venous Line     	[] R	[] L	[] IJ	[] Fem	[] SC        Type:	    Date Placed:   [] Arterial Line		[] R	[] L	[] Fem	[] Rad	[] Ax	Date Placed:   [] PICC:         	[] Midline		[] Mediport           [] Urinary Catheter		Date Placed: SICU Consultation Note  =====================================================  HPI:   30y male      Surgery Information  OR time:  7.5h    EBL: 500cc      UO: 300cc            IV Fluids: 5L (2 LR, 3 NS)      Blood Products: none (intra-op ABG hb 13)           PAST MEDICAL & SURGICAL HISTORY:  Brain tumor  Seizure: last activity a week ago  No significant past surgical history    HPI: 31 yo M w/hx of seizures has hx of seizures. Started 3 years ago. Had MRI 6 months ago showing mass in R temporal lobe. Went for craniotomy and mass resection today.    Hemodynamically stable throughout case, no pressors needed. Intubated and extubated without complications. No mannitol given. Given Ancef 2g, Decadron 12mg. Took 1000mg Keppra this morning (takes 1000mg BID), received 500 mg IV by anesthesia as well. Received in PACU states he has pain at incision site but controlled with morphine. No other complaints.     Allergies    No Known Allergies    Intolerances      SH:  Home Medications:  Aptiom: 1000 milligram(s) orally once a day (at bedtime) (16 Jun 2020 07:04)  levETIRAcetam 1000 mg oral tablet: 1 tab(s) orally 2 times a day (16 Jun 2020 07:04)    Advanced Directives: Full Code     ROS:  [ ] A ten-point review of systems was otherwise negative except as noted.  [ ] Due to altered mental status/intubation, subjective information were not able to be obtained from the patient. History was obtained, to the extent possible, from review of the chart and collateral sources of information.      CURRENT MEDICATIONS:   --------------------------------------------------------------------------------------  Neurologic Medications  acetaminophen   Tablet .. 650 milliGRAM(s) Oral every 4 hours PRN Temp greater or equal to 38C (100.4F), Mild Pain (1 - 3)  acetaminophen  IVPB .. 1000 milliGRAM(s) IV Intermittent once  eslicarbazepine 1000 milliGRAM(s) Oral daily  levETIRAcetam 1000 milliGRAM(s) Oral two times a day  morphine  - Injectable 4 milliGRAM(s) IV Push every 10 minutes PRN Severe Pain (7 - 10)  morphine  - Injectable 2 milliGRAM(s) IV Push every 10 minutes PRN Moderate Pain (4 - 6)  morphine  - Injectable 4 milliGRAM(s) IV Push every 3 hours PRN severe breakthrough pain  ondansetron Injectable 4 milliGRAM(s) IV Push once PRN Nausea and/or Vomiting  ondansetron Injectable 4 milliGRAM(s) IV Push every 6 hours PRN Nausea and/or Vomiting  oxycodone    5 mG/acetaminophen 325 mG 1 Tablet(s) Oral every 4 hours PRN Moderate Pain (4 - 6)  oxycodone    5 mG/acetaminophen 325 mG 2 Tablet(s) Oral every 6 hours PRN Severe Pain (7 - 10)  oxycodone    5 mG/acetaminophen 325 mG 2 Tablet(s) Oral once PRN Severe Pain (7 - 10)    Respiratory Medications    Cardiovascular Medications    Gastrointestinal Medications  pantoprazole    Tablet 40 milliGRAM(s) Oral before breakfast  senna 2 Tablet(s) Oral at bedtime  sodium chloride 0.9%. 1000 milliLiter(s) IV Continuous <Continuous>    Genitourinary Medications    Hematologic/Oncologic Medications    Antimicrobial/Immunologic Medications    Endocrine/Metabolic Medications  dexAMETHasone  Injectable 4 milliGRAM(s) IV Push every 6 hours    Topical/Other Medications    --------------------------------------------------------------------------------------    Vital Signs Last 24 Hrs  T(C): 36.7 (16 Jun 2020 13:41), Max: 36.7 (16 Jun 2020 13:41)  T(F): 98.1 (16 Jun 2020 13:41), Max: 98.1 (16 Jun 2020 13:41)  HR: 93 (16 Jun 2020 14:11) (77 - 100)  BP: 121/70 (16 Jun 2020 14:11) (120/67 - 129/72)  BP(mean): --  RR: 18 (16 Jun 2020 14:11) (14 - 21)  SpO2: 99% (16 Jun 2020 14:11) (98% - 99%)  I&O's Detail    I&O's Summary      LABS:  ABG - ( 16 Jun 2020 10:01 )  pH, Arterial: 7.42  pH, Blood: x     /  pCO2: 35    /  pO2: 252   / HCO3: 23    / Base Excess: -1.0  /  SaO2: 100                                 EXAM:  General/Neuro  GCS:   Exam: Normal, NAD, alert, oriented x 3, no focal deficits. CN 2-12 in tact, no dysmetria, power 5/5 and sensation intact to light touch in all 4 extremities. Dressing with out sig extravasation of fluid, surrounding areas indicating acute infection.    Respiratory  Exam: Lungs clear to auscultation, Normal expansion/effort.  ***  2L NC    Cardiovascular  Exam: S1, S2.  Regular rate and rhythm.     GI  Exam: Abdomen soft, Non-tender, Non-distended.      Extremities  Exam: Extremities warm, pink, well-perfused.   Peripheral edema    Derm:  Exam: Good skin turgor, no skin breakdown.      :   Exam: Wells catheter in place.     Tubes/Lines/Drains  ***  [x] Peripheral IV  [] Central Venous Line     	[] R	[] L	[] IJ	[] Fem	[] SC        Type:	    Date Placed:   [X] Arterial Line		[] R	[] L	[] Fem	[X] Rad	[] Ax	Date Placed:   [] PICC:         	[] Midline		[] Mediport           [] Urinary Catheter		Date Placed:

## 2020-06-16 NOTE — CONSULT NOTE ADULT - SUBJECTIVE AND OBJECTIVE BOX
HPI: 31 yo M admitted on 6/16 for right craniotomy for resection of brain tumor. Prior to hospitalization he was ambulating independent. Post op rehab consulted for eval      PAST MEDICAL & SURGICAL HISTORY:  Brain tumor  Seizure: last activity a week ago  No significant past surgical history      Hospital Course:    TODAY'S SUBJECTIVE & REVIEW OF SYMPTOMS:     Constitutional WNL   Cardio WNL   Resp WNL   GI WNL  Heme WNL  Endo WNL  Skin WNL  MSK surgical site pain  Neuro WNL  Cognitive WNL  Psych WNL      MEDICATIONS  (STANDING):  acetaminophen  IVPB .. 1000 milliGRAM(s) IV Intermittent once  dexAMETHasone  Injectable 4 milliGRAM(s) IV Push every 6 hours  eslicarbazepine 1000 milliGRAM(s) Oral daily  levETIRAcetam 1000 milliGRAM(s) Oral two times a day  pantoprazole    Tablet 40 milliGRAM(s) Oral before breakfast  senna 2 Tablet(s) Oral at bedtime  sodium chloride 0.9%. 1000 milliLiter(s) (75 mL/Hr) IV Continuous <Continuous>    MEDICATIONS  (PRN):  acetaminophen   Tablet .. 650 milliGRAM(s) Oral every 4 hours PRN Temp greater or equal to 38C (100.4F), Mild Pain (1 - 3)  morphine  - Injectable 4 milliGRAM(s) IV Push every 10 minutes PRN Severe Pain (7 - 10)  morphine  - Injectable 2 milliGRAM(s) IV Push every 10 minutes PRN Moderate Pain (4 - 6)  morphine  - Injectable 4 milliGRAM(s) IV Push every 3 hours PRN severe breakthrough pain  ondansetron Injectable 4 milliGRAM(s) IV Push once PRN Nausea and/or Vomiting  ondansetron Injectable 4 milliGRAM(s) IV Push every 6 hours PRN Nausea and/or Vomiting  oxycodone    5 mG/acetaminophen 325 mG 1 Tablet(s) Oral every 4 hours PRN Moderate Pain (4 - 6)  oxycodone    5 mG/acetaminophen 325 mG 2 Tablet(s) Oral every 6 hours PRN Severe Pain (7 - 10)  oxycodone    5 mG/acetaminophen 325 mG 2 Tablet(s) Oral once PRN Severe Pain (7 - 10)      FAMILY HISTORY:  Family history of diabetes mellitus (DM)  FH: heart disease  FH: HTN (hypertension)      Allergies    No Known Allergies    Intolerances        SOCIAL HISTORY:    [  ] Etoh  [  ] Smoking  [  ] Substance abuse     Home Environment:  [  ] Home Alone  [x  ] Lives with Family  [  ] Home Health Aid    Dwelling:  [  ] Apartment  [x  ] Private House  [  ] Adult Home  [  ] Skilled Nursing Facility      [  ] Short Term  [  ] Long Term  [x  ] Stairs       Elevator [  ]    FUNCTIONAL STATUS PTA: (Check all that apply)  Ambulation: [  x ]Independent    [  ] Dependent     [  ] Non-Ambulatory  Assistive Device: [  ] SA Cane  [  ]  Q Cane  [  ] Walker  [  ]  Wheelchair  ADL : [ x ] Independent  [  ]  Dependent       Vital Signs Last 24 Hrs  T(C): 36.7 (16 Jun 2020 13:41), Max: 36.7 (16 Jun 2020 13:41)  T(F): 98.1 (16 Jun 2020 13:41), Max: 98.1 (16 Jun 2020 13:41)  HR: 83 (16 Jun 2020 14:26) (77 - 100)  BP: 115/62 (16 Jun 2020 14:26) (115/62 - 129/72)  BP(mean): --  RR: 18 (16 Jun 2020 14:26) (14 - 21)  SpO2: 99% (16 Jun 2020 14:26) (98% - 99%)      PHYSICAL EXAM: Alert & Oriented X3  GENERAL: NAD, well-groomed, well-developed  HEAD: Normocephalic  CHEST/LUNG: Clear   HEART: S1S2+  ABDOMEN: Soft, Nontender  EXTREMITIES:  no calf tenderness    NERVOUS SYSTEM:  Cranial Nerves 2-12 intact [  ] Abnormal  [  ]  ROM: WFL all extremities [x  ]  Abnormal [  ]  Motor Strength: WFL all extremities  [x  ]  Abnormal [  ]  Sensation: intact to light touch [x  ] Abnormal [  ]  Reflexes: Symmetric [  ]  Abnormal [  ]    FUNCTIONAL STATUS:  Bed Mobility: Independent [  ]  Supervision [  ]  Needs Assistance [x  ]  N/A [  ]  Transfers: Independent [  ]  Supervision [  ]  Needs Assistance [ x ]  N/A [  ]   Ambulation: Independent [  ]  Supervision [  ]  Needs Assistance [  ]  N/A [  ]  ADL: Independent [  ] Requires Assistance [  ] N/A [  ]      LABS:                RADIOLOGY & ADDITIONAL STUDIES:    Assesment:

## 2020-06-16 NOTE — CHART NOTE - NSCHARTNOTEFT_GEN_A_CORE
PACU ANESTHESIA ADMISSION NOTE      Procedure: Excision of lesion of cerebral cortex    Post op diagnosis:      ____  Intubated  TV:______       Rate: ______      FiO2: ______    ____  Patent Airway    _x___  Full return of protective reflexes    _x___  Full recovery from anesthesia / back to baseline     Vitals:   T: 98.1          R:   12               BP: 147/70                 Sat: 98                  P: 107      Mental Status:  __x__ Awake   ___x__ Alert   _____ Drowsy   _____ Sedated    Nausea/Vomiting:  _x___ NO  ______Yes,   See Post - Op Orders          Pain Scale (0-10):  _____    Treatment: ____ None    __x__ See Post - Op/PCA Orders    Post - Operative Fluids:   x____ Oral   __x__ See Post - Op Orders    Plan: Discharge:   ____Home       _____Floor     _____Critical Care    x_____  Other:__alert and awake  moving all extremities report given to ABBEY CARBALLO _______________    Comments:

## 2020-06-16 NOTE — CONSULT NOTE ADULT - ASSESSMENT
IMPRESSION: Rehab of right brain mass resection / craniotomy    PRECAUTIONS: [  ] Cardiac  [  ] Respiratory  [  ] Seizures [  ] Contact Isolation  [  ] Droplet Isolation  [  ] Other    Weight Bearing Status:     RECOMMENDATION:    Out of Bed to Chair     DVT/Decubiti Prophylaxis    REHAB PLAN:     [ x  ] Bedside P/T 3-5 times a week   [   ]   Bedside O/T  2-3 times a week             [   ] No Rehab Therapy Indicated                   [   ]  Speech Therapy   Conditioning/ROM                                    ADL  Bed Mobility                                               Conditioning/ROM  Transfers                                                     Bed Mobility  Sitting /Standing Balance                         Transfers                                        Gait Training                                               Sitting/Standing Balance  Stair Training [   ]Applicable                    Home equipment Eval                                                                        Splinting  [   ] Only      GOALS:   ADL   [   ]   Independent                    Transfers  [ x  ] Independent                          Ambulation  [ x  ] Independent     [ x   ] With device                            [   ]  CG                                                         [   ]  CG                                                                  [   ] CG                            [    ] Min A                                                   [   ] Min A                                                              [   ] Min  A          DISCHARGE PLAN:   [   ]  Good candidate for Intensive Rehabilitation/Hospital based                                             Will tolerate 3hrs Intensive Rehab Daily                                       [    ]  Short Term Rehab in Skilled Nursing Facility                                       [ x   ]  Home with Outpatient or  services                                         [    ]  Possible Candidate for Intensive Hospital based Rehab

## 2020-06-17 LAB
ANION GAP SERPL CALC-SCNC: 12 MMOL/L — SIGNIFICANT CHANGE UP (ref 7–14)
ANION GAP SERPL CALC-SCNC: 13 MMOL/L — SIGNIFICANT CHANGE UP (ref 7–14)
APPEARANCE UR: CLEAR — SIGNIFICANT CHANGE UP
BILIRUB UR-MCNC: NEGATIVE — SIGNIFICANT CHANGE UP
BUN SERPL-MCNC: 10 MG/DL — SIGNIFICANT CHANGE UP (ref 10–20)
BUN SERPL-MCNC: 10 MG/DL — SIGNIFICANT CHANGE UP (ref 10–20)
CALCIUM SERPL-MCNC: 8.5 MG/DL — SIGNIFICANT CHANGE UP (ref 8.5–10.1)
CALCIUM SERPL-MCNC: 8.7 MG/DL — SIGNIFICANT CHANGE UP (ref 8.5–10.1)
CHLORIDE SERPL-SCNC: 103 MMOL/L — SIGNIFICANT CHANGE UP (ref 98–110)
CHLORIDE SERPL-SCNC: 104 MMOL/L — SIGNIFICANT CHANGE UP (ref 98–110)
CK MB CFR SERPL CALC: 7 NG/ML — HIGH (ref 0.6–6.3)
CK SERPL-CCNC: 1995 U/L — HIGH (ref 0–225)
CO2 SERPL-SCNC: 21 MMOL/L — SIGNIFICANT CHANGE UP (ref 17–32)
CO2 SERPL-SCNC: 24 MMOL/L — SIGNIFICANT CHANGE UP (ref 17–32)
COLOR SPEC: COLORLESS — SIGNIFICANT CHANGE UP
CREAT SERPL-MCNC: 0.6 MG/DL — LOW (ref 0.7–1.5)
CREAT SERPL-MCNC: 0.7 MG/DL — SIGNIFICANT CHANGE UP (ref 0.7–1.5)
DIFF PNL FLD: NEGATIVE — SIGNIFICANT CHANGE UP
GLUCOSE BLDC GLUCOMTR-MCNC: 111 MG/DL — HIGH (ref 70–99)
GLUCOSE BLDC GLUCOMTR-MCNC: 136 MG/DL — HIGH (ref 70–99)
GLUCOSE BLDC GLUCOMTR-MCNC: 143 MG/DL — HIGH (ref 70–99)
GLUCOSE BLDC GLUCOMTR-MCNC: 154 MG/DL — HIGH (ref 70–99)
GLUCOSE BLDC GLUCOMTR-MCNC: 183 MG/DL — HIGH (ref 70–99)
GLUCOSE SERPL-MCNC: 155 MG/DL — HIGH (ref 70–99)
GLUCOSE SERPL-MCNC: 159 MG/DL — HIGH (ref 70–99)
GLUCOSE UR QL: NEGATIVE — SIGNIFICANT CHANGE UP
KETONES UR-MCNC: NEGATIVE — SIGNIFICANT CHANGE UP
LEUKOCYTE ESTERASE UR-ACNC: NEGATIVE — SIGNIFICANT CHANGE UP
MAGNESIUM SERPL-MCNC: 2.1 MG/DL — SIGNIFICANT CHANGE UP (ref 1.8–2.4)
NITRITE UR-MCNC: NEGATIVE — SIGNIFICANT CHANGE UP
OSMOLALITY SERPL: 297 MOSMOL/KG — SIGNIFICANT CHANGE UP (ref 275–300)
OSMOLALITY UR: 371 MOS/KG — SIGNIFICANT CHANGE UP (ref 50–1400)
PH UR: 7 — SIGNIFICANT CHANGE UP (ref 5–8)
PHOSPHATE SERPL-MCNC: 3.4 MG/DL — SIGNIFICANT CHANGE UP (ref 2.1–4.9)
POTASSIUM SERPL-MCNC: 4.3 MMOL/L — SIGNIFICANT CHANGE UP (ref 3.5–5)
POTASSIUM SERPL-MCNC: 4.3 MMOL/L — SIGNIFICANT CHANGE UP (ref 3.5–5)
POTASSIUM SERPL-SCNC: 4.3 MMOL/L — SIGNIFICANT CHANGE UP (ref 3.5–5)
POTASSIUM SERPL-SCNC: 4.3 MMOL/L — SIGNIFICANT CHANGE UP (ref 3.5–5)
PROT UR-MCNC: NEGATIVE — SIGNIFICANT CHANGE UP
SODIUM SERPL-SCNC: 137 MMOL/L — SIGNIFICANT CHANGE UP (ref 135–146)
SODIUM SERPL-SCNC: 140 MMOL/L — SIGNIFICANT CHANGE UP (ref 135–146)
SP GR SPEC: 1.01 — SIGNIFICANT CHANGE UP (ref 1.01–1.02)
TROPONIN T SERPL-MCNC: <0.01 NG/ML — SIGNIFICANT CHANGE UP
UROBILINOGEN FLD QL: SIGNIFICANT CHANGE UP

## 2020-06-17 PROCEDURE — 71045 X-RAY EXAM CHEST 1 VIEW: CPT | Mod: 26

## 2020-06-17 PROCEDURE — 70553 MRI BRAIN STEM W/O & W/DYE: CPT | Mod: 26

## 2020-06-17 PROCEDURE — 99232 SBSQ HOSP IP/OBS MODERATE 35: CPT

## 2020-06-17 RX ORDER — DEXAMETHASONE 0.5 MG/5ML
2 ELIXIR ORAL EVERY 12 HOURS
Refills: 0 | Status: DISCONTINUED | OUTPATIENT
Start: 2020-06-19 | End: 2020-06-18

## 2020-06-17 RX ORDER — DEXAMETHASONE 0.5 MG/5ML
4 ELIXIR ORAL EVERY 6 HOURS
Refills: 0 | Status: COMPLETED | OUTPATIENT
Start: 2020-06-17 | End: 2020-06-18

## 2020-06-17 RX ORDER — CHLORHEXIDINE GLUCONATE 213 G/1000ML
1 SOLUTION TOPICAL ONCE
Refills: 0 | Status: DISCONTINUED | OUTPATIENT
Start: 2020-06-17 | End: 2020-06-18

## 2020-06-17 RX ORDER — DEXAMETHASONE 0.5 MG/5ML
1 ELIXIR ORAL EVERY 24 HOURS
Refills: 0 | Status: CANCELLED | OUTPATIENT
Start: 2020-06-22 | End: 2020-06-18

## 2020-06-17 RX ORDER — DEXAMETHASONE 0.5 MG/5ML
ELIXIR ORAL
Refills: 0 | Status: DISCONTINUED | OUTPATIENT
Start: 2020-06-17 | End: 2020-06-18

## 2020-06-17 RX ORDER — DEXAMETHASONE 0.5 MG/5ML
1 ELIXIR ORAL EVERY 12 HOURS
Refills: 0 | Status: CANCELLED | OUTPATIENT
Start: 2020-06-20 | End: 2020-06-18

## 2020-06-17 RX ORDER — DEXAMETHASONE 0.5 MG/5ML
2 ELIXIR ORAL EVERY 6 HOURS
Refills: 0 | Status: DISCONTINUED | OUTPATIENT
Start: 2020-06-18 | End: 2020-06-18

## 2020-06-17 RX ORDER — HEPARIN SODIUM 5000 [USP'U]/ML
5000 INJECTION INTRAVENOUS; SUBCUTANEOUS EVERY 8 HOURS
Refills: 0 | Status: DISCONTINUED | OUTPATIENT
Start: 2020-06-17 | End: 2020-06-18

## 2020-06-17 RX ADMIN — Medication 650 MILLIGRAM(S): at 06:24

## 2020-06-17 RX ADMIN — Medication 4 MILLIGRAM(S): at 00:19

## 2020-06-17 RX ADMIN — SENNA PLUS 2 TABLET(S): 8.6 TABLET ORAL at 21:05

## 2020-06-17 RX ADMIN — Medication 4 MILLIGRAM(S): at 06:06

## 2020-06-17 RX ADMIN — Medication 4 MILLIGRAM(S): at 11:38

## 2020-06-17 RX ADMIN — HEPARIN SODIUM 5000 UNIT(S): 5000 INJECTION INTRAVENOUS; SUBCUTANEOUS at 21:05

## 2020-06-17 RX ADMIN — HEPARIN SODIUM 5000 UNIT(S): 5000 INJECTION INTRAVENOUS; SUBCUTANEOUS at 11:38

## 2020-06-17 RX ADMIN — LEVETIRACETAM 1000 MILLIGRAM(S): 250 TABLET, FILM COATED ORAL at 06:07

## 2020-06-17 RX ADMIN — Medication 4 MILLIGRAM(S): at 18:44

## 2020-06-17 RX ADMIN — LEVETIRACETAM 1000 MILLIGRAM(S): 250 TABLET, FILM COATED ORAL at 18:44

## 2020-06-17 RX ADMIN — ESLICARBAZEPINE ACETATE 1000 MILLIGRAM(S): 800 TABLET ORAL at 11:38

## 2020-06-17 RX ADMIN — Medication 4 MILLIGRAM(S): at 23:12

## 2020-06-17 RX ADMIN — PANTOPRAZOLE SODIUM 40 MILLIGRAM(S): 20 TABLET, DELAYED RELEASE ORAL at 06:07

## 2020-06-17 NOTE — PROGRESS NOTE ADULT - SUBJECTIVE AND OBJECTIVE BOX
Subjective: 30yMale with a pmhx of G93.89, [id number]              ** AM ADMIT **  G93.89, 23069  ^G93.89, 51779                                                                                           NYDL/INS SHOWN  Family history of diabetes mellitus (DM)  FH: heart disease  FH: HTN (hypertension)  Handoff  Brain tumor  Seizure  Excision of lesion of cerebral cortex  No significant past surgical history    POD#1  s/p  right crani for removal of temporal mass    Pt seen and examined at bedside with Dr Barber.  Pt sts hes feeling ok.  c/o mild headache 5-6/10, denies dizziness or visual changes.  AAOX3, following commands.  Postop CTH complete, needs MRI brain today.     Allergies    No Known Allergies    Intolerances        Vital Signs Last 24 Hrs  T(C): 37.1 (17 Jun 2020 08:00), Max: 37.1 (17 Jun 2020 08:00)  T(F): 98.8 (17 Jun 2020 08:00), Max: 98.8 (17 Jun 2020 08:00)  HR: 58 (17 Jun 2020 08:00) (53 - 100)  BP: 114/64 (17 Jun 2020 05:00) (113/63 - 137/65)  BP(mean): 85 (17 Jun 2020 05:00) (77 - 92)  RR: 14 (17 Jun 2020 08:00) (12 - 21)  SpO2: 96% (17 Jun 2020 08:00) (96% - 100%)      acetaminophen   Tablet .. 650 milliGRAM(s) Oral every 4 hours PRN  chlorhexidine 4% Liquid 1 Application(s) Topical once  dexAMETHasone  Injectable 4 milliGRAM(s) IV Push every 6 hours  eslicarbazepine 1000 milliGRAM(s) Oral daily  levETIRAcetam 1000 milliGRAM(s) Oral two times a day  morphine  - Injectable 4 milliGRAM(s) IV Push every 3 hours PRN  ondansetron Injectable 4 milliGRAM(s) IV Push every 6 hours PRN  oxycodone    5 mG/acetaminophen 325 mG 1 Tablet(s) Oral every 4 hours PRN  oxycodone    5 mG/acetaminophen 325 mG 2 Tablet(s) Oral every 6 hours PRN  pantoprazole    Tablet 40 milliGRAM(s) Oral before breakfast  senna 2 Tablet(s) Oral at bedtime  sodium chloride 0.9%. 1000 milliLiter(s) IV Continuous <Continuous>        06-16-20 @ 07:01  -  06-17-20 @ 07:00  --------------------------------------------------------  IN: 1595 mL / OUT: 4325 mL / NET: -2730 mL          Exam:  AAOX3. Verbal function intact  follows commands  tongue midline  no droop  PERRL  tracking  Pronator Drift: none  Finger to Nose intact  good motor strength b/l    wound- head dressing intact, wound clean, no drainage        CBC Full  -  ( 16 Jun 2020 23:11 )  WBC Count : 15.05 K/uL  RBC Count : 4.87 M/uL  Hemoglobin : 13.8 g/dL  Hematocrit : 39.7 %  Platelet Count - Automated : 233 K/uL  Mean Cell Volume : 81.5 fL  Mean Cell Hemoglobin : 28.3 pg  Mean Cell Hemoglobin Concentration : 34.8 g/dL  Auto Neutrophil # : 13.74 K/uL  Auto Lymphocyte # : 0.64 K/uL  Auto Monocyte # : 0.61 K/uL  Auto Eosinophil # : 0.00 K/uL  Auto Basophil # : 0.01 K/uL  Auto Neutrophil % : 91.2 %  Auto Lymphocyte % : 4.3 %  Auto Monocyte % : 4.1 %  Auto Eosinophil % : 0.0 %  Auto Basophil % : 0.1 %    06-17    140  |  104  |  10  ----------------------------<  159<H>  4.3   |  24  |  0.7    Ca    8.7      17 Jun 2020 04:25  Phos  3.4     06-16  Mg     2.1     06-16    TPro  5.7<L>  /  Alb  4.0  /  TBili  0.4  /  DBili  x   /  AST  20  /  ALT  27  /  AlkPhos  56  06-16    PT/INR - ( 16 Jun 2020 15:00 )   PT: 13.30 sec;   INR: 1.16 ratio         PTT - ( 16 Jun 2020 15:00 )  PTT:29.3 sec        Imaging: < from: CT Head No Cont (06.16.20 @ 17:46) >  IMPRESSION:    1.  Posterior craniotomy.    2.  Postoperative changes as described. Pneumocephalus and mild mass effect.    < end of copied text >      Assessment/Plan: as above  cont Keppra  wean Decadron  start SQ Hep  MRI brain w/wo gado  OK for downgrade  d/w attg

## 2020-06-17 NOTE — CHART NOTE - NSCHARTNOTEFT_GEN_A_CORE
HPI: 29 yo M w/hx of seizures that started 3 years ago. He had an MRI 6 months ago showing mass in R temporal lobe. He is s/p craniotomy on 6/16/ with resection of cerebral cortex mass. Patient was upgraded to SICU for q1 neuro checks.  In the SICU, patient has beens table, requiring minimal pain meds.  He is started on a regular diet, HSQ, Decadron taper, and can be OOBTC.        PHYSICAL EXAM:    General/Neuro  Deficits: Alert & oriented x 3, no focal deficits, CN II-XII intact  Incision: Clean dry and intact    Lungs: Clear to auscultation, Normal expansion/effort.     Cardiovascular : S1, S2.  Regular rate and rhythm.  No peripheral edema.  Cardiac Rhythm: Normal Sinus Rhythm    GI: Abdomen soft, Non-tender, Non-distended.      Extremities: Extremities warm, pink, well-perfused.    Derm: Good skin turgor, no skin breakdown.      : No Wells    ASSESSMENT/PLAN: 29yo Male admitted to SICU for q1h neuro checks s/p craniotomy with R temporal mass resection.      Neurologic:  s/p craniotomy with excision of R temporal mass resection (6/16)  Hx of seizures  On home Aptiom, On Keppra 1000 BID  q4h neuro checks  neuro intact post-op  Post op CTH negative: postop chagnes, pneumocephalus and mild mass effect  Pain controlled on Tylenol and Percocet PRN    Respiratory:  RA, normal O2 sat  post op CXR WNL  No active disease    Cardiovascular:  No active disease  No hemodynamic instability intra-op  Normotensive, nontachycardic  CE negative x3  EKG showing blocked PACs, not symptomatic, monitor on tele    Gastrointestinal/Nutrition:  No active disease  Advanced to regular diet  PPI, Senna    Genitourinary/Renal:  No active disease  -700cc/hr overnight  Serum Osm 297, Urine Osm 371, Spec grav 1.01  Na 137/K 4.3/ Mag 2.1/ Phosp 3.4 (no repletions needed ON)   BUN/Cr 10/0.6    Hematologic:  500 cc EBL intra-op  Hemoglobin lateral 13  Started on HSQ    Infectious Disease:  No Active disease  Afebrile, no signs of active infection  WBC 15 (post surgical as well as on steroids)    Endocrine:  No active disease  FS qAC/HS 2/2 being on steroids    Follow up:   2330 labs   FS on steroids   Urine output, Monitor for DI    Signed out to on 6/17/20 at HPI: 29 yo M w/hx of seizures that started 3 years ago. He had an MRI 6 months ago showing mass in R temporal lobe. He is s/p craniotomy on 6/16/ with resection of cerebral cortex mass. Patient was upgraded to SICU for q1 neuro checks.  In the SICU, patient has been stable, requiring minimal pain meds.  He is started on a regular diet, HSQ, Decadron taper, and can be OOBTC.        PHYSICAL EXAM:    General/Neuro  Deficits: Alert & oriented x 3, no focal deficits, CN II-XII intact  Incision: Clean dry and intact    Lungs: Clear to auscultation, Normal expansion/effort.     Cardiovascular : S1, S2.  Regular rate and rhythm.  No peripheral edema.  Cardiac Rhythm: Normal Sinus Rhythm    GI: Abdomen soft, Non-tender, Non-distended.      Extremities: Extremities warm, pink, well-perfused.    Derm: Good skin turgor, no skin breakdown.      : No Wells    ASSESSMENT/PLAN: 29yo Male admitted to SICU for q1h neuro checks s/p craniotomy with R temporal mass resection.      Neurologic:  s/p craniotomy with excision of R temporal mass resection (6/16)  Hx of seizures  On home Aptiom, On Keppra 1000 BID  q4h neuro checks  neuro intact post-op  On Decadron taper  Post op CTH negative: postop changes, pneumocephalus and mild mass effect  Pain controlled on Tylenol and Percocet PRN  F/u MRI head on floor    Respiratory:  RA, normal O2 sat  post op CXR WNL  No active disease    Cardiovascular:  No active disease  No hemodynamic instability intra-op  Normotensive, nontachycardic  CE negative x3  EKG showing blocked PACs, not symptomatic, monitor on tele    Gastrointestinal/Nutrition:  No active disease  Advanced to regular diet  PPI, Senna    Genitourinary/Renal:  No active disease  -700cc/hr overnight  Serum Osm 297, Urine Osm 371, Spec grav 1.01  Na 137/K 4.3/ Mag 2.1/ Phosp 3.4 (no repletions needed ON)   BUN/Cr 10/0.6    Hematologic:  500 cc EBL intra-op  Hemoglobin lateral 13  Started on HSQ    Infectious Disease:  No Active disease  Afebrile, no signs of active infection  WBC 15 (post surgical as well as on steroids)    Endocrine:  No active disease  FS qAC/HS 2/2 being on steroids  On Decadron taper    Follow up:   2330 labs   FS on steroids   Urine output, Monitor for DI    Signed out to CHITO Tolentino on 6/17/20 at 12:02PM

## 2020-06-17 NOTE — PROGRESS NOTE ADULT - SUBJECTIVE AND OBJECTIVE BOX
KAYLIE MORRISON  [id number]  30y Male    Indication for ICU admission: q1h neuro checks, s/p craniotomy, R temporal lobe mass resection    Admit Date:06-16-20  ICU Date: 6/16/20  OR Date: 6/16/20    No Known Allergies    PAST MEDICAL & SURGICAL HISTORY:  Brain tumor  Seizure: last activity a week ago  No significant past surgical history    Home Medications:  Aptiom: 1000 milligram(s) orally once a day (at bedtime) (16 Jun 2020 07:04)  levETIRAcetam 1000 mg oral tablet: 1 tab(s) orally 2 times a day (16 Jun 2020 07:04)        24HRS EVENT:  -700cc/ hr  urine studies sent    Neurologic:  s/p craniotomy with excision of lesion of cerebral cortex (6/16)  hx of seizures  On home medications  q1h neuro checks  neuro intact post-op  F/u post op head ct read by neuroradiologist    Respiratory:  RA, normal O2 sat  post op CXR nl  no active disease    Cardiovascular:  No active disease  No hemodynamic instability intra-op  Monitor HR, BP  CE negative x3  EKG showing blocked PACs, not symptomatic, monitor on tele    Gastrointestinal/Nutrition:  No active disease  PPI, Senna  NPO until post op head CTBasic Metabolic Panel (06.16.20 @ 23:11)    Genitourinary/Renal:  no active disease  -700cc/hr , ucx, urine & serum osmols sent  Na 137/K 4.3/ Mag 2.1/ Phosp 3.4   Blood Urea Nitrogen, Serum: 10 mg/dL    Creatinine, Serum: 0.6 mg/dL    Hematologic:  500 cc EBL intra-op  Hemoglobin: 13.8 g/dL (06.16.20 @ 23:11)  Platelet Count - Automated: 233 K/uL (06.16.20 @ 23:11)  HSQ on hold  f/u post op head ct for possible AC    Infectious Disease:  no active disease  Afebrile, no signs of active infection  WBC Count: 15.05 K/uL (06.16.20 @ 23:11)    Endocrine:  no active disease  POCT blood glucose when NPO  POCT Blood Glucose.: 154 mg/dL (06.17.20 @ 01:56)      DVT PTX: holding until post op head ct    GI PTX: PPI    ***Tubes/Lines/Drains  ***  Peripheral IV  Central Venous Line     	Date   Arterial Line		                Date   [] PICC:         	[] Midline		[] Mediport             Urinary Catheter		Indication: Strict I&O    Date Placed:       REVIEW OF SYSTEMS    [ ] A ten-point review of systems was otherwise negative except as noted.  [ ] Due to altered mental status/intubation, subjective information were not able to be obtained from the patient. History was obtained, to the extent possible, from review of the chart and collateral sources of information    Assessment and Recommendation:   · Assessment	  ASSESSMENT/PLAN: 29yo Male admitted to SICU for q1h neuro checks s/p craniotomy with R temporal mass resection.         Disposition: SICU KAYLIE MORRISON  [id number]  30y Male    Indication for ICU admission: q1h neuro checks, s/p craniotomy, R temporal lobe mass resection    Admit Date:06-16-20  ICU Date: 6/16/20  OR Date: 6/16/20    No Known Allergies    PAST MEDICAL & SURGICAL HISTORY:  Brain tumor  Seizure: last activity a week ago  No significant past surgical history    Home Medications:  Aptiom: 1000 milligram(s) orally once a day (at bedtime) (16 Jun 2020 07:04)  levETIRAcetam 1000 mg oral tablet: 1 tab(s) orally 2 times a day (16 Jun 2020 07:04)        24HRS EVENT:  Nausea and 1 episode of emesis post-operatively, resolved with zofran 4mg x2  -700cc/ hr      Neurologic:  s/p craniotomy with excision of lesion of cerebral cortex (6/16)  hx of seizures  On home medications  q1h neuro checks  F/u post op head ct read by neuroradiologist    Respiratory:  no active disease  RA, normal O2 sat  post op CXR nl      Cardiovascular:  No active disease  Hemodynamically stable   CE negative x3  EKG showing blocked PACs, not symptomatic, monitor on tele    Gastrointestinal/Nutrition:  No active disease  PPI, Senna  NPO until post op head CT read    Genitourinary/Renal:  no active disease  -700cc/hr , ucx, urine & serum osmols sent  Na 137/K 4.3/ Mag 2.1/ Phosp 3.4   Blood Urea Nitrogen, Serum: 10 mg/dL    Creatinine, Serum: 0.6 mg/dL    Hematologic:  500 cc EBL intra-op  Hemoglobin: 13.8 g/dL (06.16.20 @ 23:11)  Platelet Count - Automated: 233 K/uL (06.16.20 @ 23:11)  HSQ on hold  f/u post op head ct for possible AC    Infectious Disease:  no active disease  Afebrile, no signs of active infection  WBC Count: 15.05 K/uL (06.16.20 @ 23:11)    Endocrine:  no active disease  POCT blood glucose when NPO  POCT Blood Glucose.: 154 mg/dL (06.17.20 @ 01:56)      DVT PTX: holding until post op head ct    GI PTX: PPI    ***Tubes/Lines/Drains  ***  Peripheral IV  Central Venous Line     	Date   Arterial Line		                Date   [] PICC:         	[] Midline		[] Mediport             Urinary Catheter		Indication: Strict I&O    Date Placed:       REVIEW OF SYSTEMS    [ ] A ten-point review of systems was otherwise negative except as noted.  [ ] Due to altered mental status/intubation, subjective information were not able to be obtained from the patient. History was obtained, to the extent possible, from review of the chart and collateral sources of information    Assessment and Recommendation:   · Assessment	  ASSESSMENT/PLAN: 31yo Male admitted to SICU for q1h neuro checks s/p craniotomy with R temporal mass resection.         Disposition: SICU KAYLIE MORRISON  [id number]  30y Male    Indication for ICU admission: q1h neuro checks, s/p craniotomy, R temporal lobe mass resection    Admit Date:20  ICU Date: 20  OR Date: 20    No Known Allergies    PAST MEDICAL & SURGICAL HISTORY:  Brain tumor  Seizure: last activity a week ago  No significant past surgical history    Home Medications:  Aptiom: 1000 milligram(s) orally once a day (at bedtime) (2020 07:04)  levETIRAcetam 1000 mg oral tablet: 1 tab(s) orally 2 times a day (2020 07:04)        24HRS EVENT:  Nausea and 1 episode of emesis post-operatively, resolved with zofran 4mg x2  -700cc/ hr      Neurologic:  s/p craniotomy with excision of lesion of cerebral cortex ()  hx of seizures  On home medications  q1h neuro checks  F/u post op head ct read by neuroradiologist    Respiratory:  no active disease  RA, normal O2 sat  post op CXR nl      Cardiovascular:  No active disease  Hemodynamically stable   CE negative x3  EKG showing blocked PACs, not symptomatic, monitor on tele    Gastrointestinal/Nutrition:  No active disease  PPI, Senna  NPO until post op head CT read    Genitourinary/Renal:  no active disease  -700cc/hr , ucx, urine & serum osmols sent  Na 137/K 4.3/ Mag 2.1/ Phosp 3.4   Blood Urea Nitrogen, Serum: 10 mg/dL    Creatinine, Serum: 0.6 mg/dL    Hematologic:  500 cc EBL intra-op  Hemoglobin: 13.8 g/dL (20 @ 23:11)  Platelet Count - Automated: 233 K/uL (20 @ 23:11)  HSQ on hold  f/u post op head ct for possible AC    Infectious Disease:  no active disease  Afebrile, no signs of active infection  WBC Count: 15.05 K/uL (20 @ 23:11)    Endocrine:  no active disease  POCT blood glucose when NPO  POCT Blood Glucose.: 154 mg/dL (20 @ 01:56)      DVT PTX: holding until post op head ct    GI PTX: PPI    ***Tubes/Lines/Drains  ***  Peripheral IV  Central Venous Line     	Date   Arterial Line		                Date   [] PICC:         	[] Midline		[] Mediport             Urinary Catheter		Indication: Strict I&O    Date Placed:       REVIEW OF SYSTEMS    [x] A ten-point review of systems was otherwise negative except as noted.  [ ] Due to altered mental status/intubation, subjective information were not able to be obtained from the patient. History was obtained, to the extent possible, from review of the chart and collateral sources of information    Daily Height in cm: 175.26 (2020 20:40)    Daily Weight in k.1 (2020 20:40)    Diet, Full Liquid (20 @ 14:15)  Diet, Regular (20 @ 14:15)  Diet, Clear Liquid (20 @ 02:43)      CURRENT MEDS:  Neurologic Medications  acetaminophen   Tablet .. 650 milliGRAM(s) Oral every 4 hours PRN Temp greater or equal to 38C (100.4F), Mild Pain (1 - 3)  eslicarbazepine 1000 milliGRAM(s) Oral daily  levETIRAcetam 1000 milliGRAM(s) Oral two times a day  morphine  - Injectable 4 milliGRAM(s) IV Push every 3 hours PRN severe breakthrough pain  ondansetron Injectable 4 milliGRAM(s) IV Push every 6 hours PRN Nausea and/or Vomiting  oxycodone    5 mG/acetaminophen 325 mG 1 Tablet(s) Oral every 4 hours PRN Moderate Pain (4 - 6)  oxycodone    5 mG/acetaminophen 325 mG 2 Tablet(s) Oral every 6 hours PRN Severe Pain (7 - 10)    Respiratory Medications    Cardiovascular Medications    Gastrointestinal Medications  pantoprazole    Tablet 40 milliGRAM(s) Oral before breakfast  senna 2 Tablet(s) Oral at bedtime  sodium chloride 0.9%. 1000 milliLiter(s) IV Continuous <Continuous>    Genitourinary Medications    Hematologic/Oncologic Medications    Antimicrobial/Immunologic Medications    Endocrine/Metabolic Medications  dexAMETHasone  Injectable 4 milliGRAM(s) IV Push every 6 hours    Topical/Other Medications  chlorhexidine 4% Liquid 1 Application(s) Topical once      ICU Vital Signs Last 24 Hrs  T(C): 36.7 (2020 05:00), Max: 36.9 (2020 14:41)  T(F): 98.1 (2020 05:00), Max: 98.4 (2020 14:41)  HR: 58 (2020 07:00) (53 - 100)  BP: 114/64 (2020 05:00) (113/63 - 137/65)  BP(mean): 85 (2020 05:00) (77 - 92)  ABP: 132/62 (2020 07:00) (128/64 - 160/76)  ABP(mean): 82 (2020 07:00) (78 - 98)  RR: 13 (2020 07:00) (12 - 21)  SpO2: 97% (2020 07:00) (97% - 100%)      Adult Advanced Hemodynamics Last 24 Hrs  CVP(mm Hg): --  CVP(cm H2O): --  CO: --  CI: --  PA: --  PA(mean): --  PCWP: --  SVR: --  SVRI: --  PVR: --  PVRI: --      ABG - ( 2020 10:01 )  pH, Arterial: 7.42  pH, Blood: x     /  pCO2: 35    /  pO2: 252   / HCO3: 23    / Base Excess: -1.0  /  SaO2: 100                 I&O's Summary    2020 07:01  -  2020 07:00  --------------------------------------------------------  IN: 1595 mL / OUT: 4325 mL / NET: -2730 mL      I&O's Detail    2020 07:01  -  2020 07:00  --------------------------------------------------------  IN:    IV PiggyBack: 100 mL    lactated ringers.: 75 mL    Oral Fluid: 220 mL    sodium chloride 0.9%.: 1200 mL  Total IN: 1595 mL    OUT:    Indwelling Catheter - Urethral: 725 mL    Voided: 3600 mL  Total OUT: 4325 mL    Total NET: -2730 mL          PHYSICAL EXAM:    General/Neuro  RASS:             GCS:  15     Deficits:    alert & oriented x 3, no focal deficits, CN II-XII intact  Incision: Clean dry and intact    Lungs:      clear to auscultation, Normal expansion/effort.     Cardiovascular : S1, S2.  Regular rate and rhythm.  Peripheral edema   Cardiac Rhythm: Normal Sinus Rhythm    GI: Abdomen soft, Non-tender, Non-distended.      Extremities: Extremities warm, pink, well-perfused.    Derm: Good skin turgor, no skin breakdown.      :    No bennett    CAPILLARY BLOOD GLUCOSE    POCT Blood Glucose.: 111 mg/dL (2020 06:17)  POCT Blood Glucose.: 154 mg/dL (2020 01:56)  POCT Blood Glucose.: 156 mg/dL (2020 23:05)  POCT Blood Glucose.: 134 mg/dL (2020 17:54)                        13.8   15.05 )-----------( 233      ( 2020 23:11 )             39.7       06-17    140  |  104  |  10  ----------------------------<  159<H>  4.3   |  24  |  0.7    Ca    8.7      2020 04:25  Phos  3.4     06-16  Mg     2.1     06-16    TPro  5.7<L>  /  Alb  4.0  /  TBili  0.4  /  DBili  x   /  AST  20  /  ALT  27  /  AlkPhos  56  06-16      PT/INR - ( 2020 15:00 )   PT: 13.30 sec;   INR: 1.16 ratio         PTT - ( 2020 15:00 )  PTT:29.3 sec  CARDIAC MARKERS ( 2020 01:59 )  x     / <0.01 ng/mL / 1995 U/L / x     / 7.0 ng/mL  CARDIAC MARKERS ( 2020 19:55 )  x     / <0.01 ng/mL / 1892 U/L / x     / 6.3 ng/mL  CARDIAC MARKERS ( 2020 15:00 )  x     / <0.01 ng/mL / 601 U/L / x     / 3.1 ng/mL      Urinalysis Basic - ( 2020 04:25 )    Color: Colorless / Appearance: Clear / S.011 / pH: x  Gluc: x / Ketone: Negative  / Bili: Negative / Urobili: <2 mg/dL   Blood: x / Protein: Negative / Nitrite: Negative   Leuk Esterase: Negative / RBC: x / WBC x   Sq Epi: x / Non Sq Epi: x / Bacteria: x

## 2020-06-17 NOTE — PROGRESS NOTE ADULT - ATTENDING COMMENTS
I personally examined this patient with the PA and resident and discussed my plan with them.    neuro stable  decadron taper per nsgy  pt rehab  downgrade to floor

## 2020-06-17 NOTE — PHYSICAL THERAPY INITIAL EVALUATION ADULT - GENERAL OBSERVATIONS, REHAB EVAL
pt received villalobos in bed, left in bed in chair mode, in no apparent distress, +A-line, +IV, +Sx dressing dry and intact, + without complaints, +call bell within reach, RN made aware

## 2020-06-17 NOTE — PROGRESS NOTE ADULT - ASSESSMENT
ASSESSMENT/PLAN: 31yo Male admitted to SICU for q1h neuro checks s/p craniotomy with R temporal mass resection.      Neurologic:  s/p craniotomy with excision of lesion of cerebral cortex (6/16)  hx of seizures  On home medications  q1h neuro checks  neuro intact post-op  F/u post op head ct read by neuroradiologist    Respiratory:  RA, normal O2 sat  post op CXR nl  no active disease    Cardiovascular:  No active disease  No hemodynamic instability intra-op  Monitor HR, BP  CE negative x3  EKG showing blocked PACs, not symptomatic, monitor on tele    Gastrointestinal/Nutrition:  No active disease  PPI, Senna  NPO until post op head CTBasic Metabolic Panel (06.16.20 @ 23:11)    Genitourinary/Renal:  no active disease  -700cc/hr , ucx, urine & serum osmols sent  Na 137/K 4.3/ Mag 2.1/ Phosp 3.4   Blood Urea Nitrogen, Serum: 10 mg/dL    Creatinine, Serum: 0.6 mg/dL    Hematologic:  500 cc EBL intra-op  Hemoglobin: 13.8 g/dL (06.16.20 @ 23:11)  Platelet Count - Automated: 233 K/uL (06.16.20 @ 23:11)  HSQ on hold  f/u post op head ct for possible AC    Infectious Disease:  no active disease  Afebrile, no signs of active infection  WBC Count: 15.05 K/uL (06.16.20 @ 23:11)    Endocrine:  no active disease  POCT blood glucose when NPO  POCT Blood Glucose.: 154 mg/dL (06.17.20 @ 01:56)

## 2020-06-18 ENCOUNTER — TRANSCRIPTION ENCOUNTER (OUTPATIENT)
Age: 30
End: 2020-06-18

## 2020-06-18 VITALS
HEART RATE: 82 BPM | TEMPERATURE: 98 F | DIASTOLIC BLOOD PRESSURE: 64 MMHG | RESPIRATION RATE: 18 BRPM | SYSTOLIC BLOOD PRESSURE: 123 MMHG

## 2020-06-18 LAB
ANION GAP SERPL CALC-SCNC: 11 MMOL/L — SIGNIFICANT CHANGE UP (ref 7–14)
BUN SERPL-MCNC: 10 MG/DL — SIGNIFICANT CHANGE UP (ref 10–20)
CALCIUM SERPL-MCNC: 8.7 MG/DL — SIGNIFICANT CHANGE UP (ref 8.5–10.1)
CHLORIDE SERPL-SCNC: 106 MMOL/L — SIGNIFICANT CHANGE UP (ref 98–110)
CO2 SERPL-SCNC: 25 MMOL/L — SIGNIFICANT CHANGE UP (ref 17–32)
CREAT SERPL-MCNC: 0.7 MG/DL — SIGNIFICANT CHANGE UP (ref 0.7–1.5)
GLUCOSE BLDC GLUCOMTR-MCNC: 129 MG/DL — HIGH (ref 70–99)
GLUCOSE BLDC GLUCOMTR-MCNC: 147 MG/DL — HIGH (ref 70–99)
GLUCOSE SERPL-MCNC: 156 MG/DL — HIGH (ref 70–99)
HCT VFR BLD CALC: 37.2 % — LOW (ref 42–52)
HGB BLD-MCNC: 12.8 G/DL — LOW (ref 14–18)
MAGNESIUM SERPL-MCNC: 1.9 MG/DL — SIGNIFICANT CHANGE UP (ref 1.8–2.4)
MCHC RBC-ENTMCNC: 28.8 PG — SIGNIFICANT CHANGE UP (ref 27–31)
MCHC RBC-ENTMCNC: 34.4 G/DL — SIGNIFICANT CHANGE UP (ref 32–37)
MCV RBC AUTO: 83.6 FL — SIGNIFICANT CHANGE UP (ref 80–94)
NRBC # BLD: 0 /100 WBCS — SIGNIFICANT CHANGE UP (ref 0–0)
PHOSPHATE SERPL-MCNC: 2.2 MG/DL — SIGNIFICANT CHANGE UP (ref 2.1–4.9)
PLATELET # BLD AUTO: 206 K/UL — SIGNIFICANT CHANGE UP (ref 130–400)
POTASSIUM SERPL-MCNC: 4.1 MMOL/L — SIGNIFICANT CHANGE UP (ref 3.5–5)
POTASSIUM SERPL-SCNC: 4.1 MMOL/L — SIGNIFICANT CHANGE UP (ref 3.5–5)
RBC # BLD: 4.45 M/UL — LOW (ref 4.7–6.1)
RBC # FLD: 12.5 % — SIGNIFICANT CHANGE UP (ref 11.5–14.5)
SODIUM SERPL-SCNC: 142 MMOL/L — SIGNIFICANT CHANGE UP (ref 135–146)
WBC # BLD: 12.21 K/UL — HIGH (ref 4.8–10.8)
WBC # FLD AUTO: 12.21 K/UL — HIGH (ref 4.8–10.8)

## 2020-06-18 RX ORDER — PANTOPRAZOLE SODIUM 20 MG/1
1 TABLET, DELAYED RELEASE ORAL
Qty: 0 | Refills: 0 | DISCHARGE
Start: 2020-06-18

## 2020-06-18 RX ORDER — SENNA PLUS 8.6 MG/1
2 TABLET ORAL
Qty: 20 | Refills: 0
Start: 2020-06-18 | End: 2020-06-27

## 2020-06-18 RX ORDER — PANTOPRAZOLE SODIUM 20 MG/1
1 TABLET, DELAYED RELEASE ORAL
Qty: 5 | Refills: 0
Start: 2020-06-18 | End: 2020-06-22

## 2020-06-18 RX ORDER — DEXAMETHASONE 0.5 MG/5ML
2 ELIXIR ORAL
Qty: 15 | Refills: 0
Start: 2020-06-18

## 2020-06-18 RX ORDER — ONDANSETRON 8 MG/1
1 TABLET, FILM COATED ORAL
Qty: 15 | Refills: 0
Start: 2020-06-18 | End: 2020-06-22

## 2020-06-18 RX ORDER — ONDANSETRON 8 MG/1
1 TABLET, FILM COATED ORAL
Qty: 0 | Refills: 0 | DISCHARGE
Start: 2020-06-18

## 2020-06-18 RX ORDER — ACETAMINOPHEN 500 MG
2 TABLET ORAL
Qty: 0 | Refills: 0 | DISCHARGE
Start: 2020-06-18

## 2020-06-18 RX ORDER — SENNA PLUS 8.6 MG/1
2 TABLET ORAL
Qty: 0 | Refills: 0 | DISCHARGE
Start: 2020-06-18

## 2020-06-18 RX ADMIN — ESLICARBAZEPINE ACETATE 1000 MILLIGRAM(S): 800 TABLET ORAL at 11:37

## 2020-06-18 RX ADMIN — Medication 4 MILLIGRAM(S): at 05:13

## 2020-06-18 RX ADMIN — LEVETIRACETAM 1000 MILLIGRAM(S): 250 TABLET, FILM COATED ORAL at 05:13

## 2020-06-18 RX ADMIN — Medication 2 MILLIGRAM(S): at 11:37

## 2020-06-18 RX ADMIN — HEPARIN SODIUM 5000 UNIT(S): 5000 INJECTION INTRAVENOUS; SUBCUTANEOUS at 13:32

## 2020-06-18 RX ADMIN — PANTOPRAZOLE SODIUM 40 MILLIGRAM(S): 20 TABLET, DELAYED RELEASE ORAL at 05:13

## 2020-06-18 RX ADMIN — HEPARIN SODIUM 5000 UNIT(S): 5000 INJECTION INTRAVENOUS; SUBCUTANEOUS at 05:14

## 2020-06-18 NOTE — DISCHARGE NOTE PROVIDER - CARE PROVIDER_API CALL
Emmie Barber  NEUROSURGERY  [address] [place] [id number]  Phone: [phone]  Fax: [phone]  Follow Up Time:

## 2020-06-18 NOTE — DISCHARGE NOTE PROVIDER - HOSPITAL COURSE
Pt is a 29 yo M w/ a hx of seizures / staring episodes. An MRI done 6 months ago showed a mass in the R temporal lobe. Patient was admitted on 6.16.20 and cleared for pre op for a R pterional craniotomy for resection of the mass. Pt is a 31 yo M w/ a hx of seizures / staring episodes. An MRI done 6 months ago showed a mass in the R temporal lobe. Patient was admitted on 6.16.20 and cleared for pre op for a R pterional craniotomy for resection of the mass. On 6.16.20 the patient tollerated the surgery well and recovered in the PACU. The patient was then brought to the ICU for continuous neuro checks and had another head CT that showed expected post operative changes and decreased size of the lesion. The patient was then downgraded to  where he received physical therapy and routine lab work. A repeat MRI with and without contrast was done on 6.17.20 which showed a stable small loculated cystic lesion seen on previous films as well as "complete  resolution of the enhancing lesion". On 6.18.20 the patient was cleared for discharge home with home services and a short course steroid taper. The pt will follow up with Dr. Barber as an outpatient in 2 weeks.

## 2020-06-18 NOTE — DISCHARGE NOTE NURSING/CASE MANAGEMENT/SOCIAL WORK - PATIENT PORTAL LINK FT
You can access the FollowMyHealth Patient Portal offered by [facility] by registering at the following website: [email or web address]. By joining [employer] [facility]’s FollowMyHealth portal, you will also be able to view your health information using other applications (apps) compatible with our system.

## 2020-06-18 NOTE — DISCHARGE NOTE PROVIDER - NSDCMRMEDTOKEN_GEN_ALL_CORE_FT
acetaminophen 325 mg oral tablet: 2 tab(s) orally every 4 hours, As needed, Temp greater or equal to 38C (100.4F), Mild Pain (1 - 3)  Aptiom: 1000 milligram(s) orally once a day (at bedtime)  dexamethasone 2 mg oral tablet: 2 mg orally every 6 hours x 1 day then 2 mg every 12 hrs x 1 day then 1 mg every 12 hrs x 1 day then 1mg every 24 hrs x 1 day  levETIRAcetam 1000 mg oral tablet: 1 tab(s) orally 2 times a day  ondansetron 4 mg oral tablet: 1 tab(s) orally every 8 hours, As Needed  oxycodone-acetaminophen 5 mg-325 mg oral tablet: 2 tab(s) orally every 6 hours, As Needed -for moderate pain - for severe pain MDD:8  pantoprazole 40 mg oral delayed release tablet: 1 tab(s) orally once a day (before a meal)  senna oral tablet: 2 tab(s) orally once a day (at bedtime) acetaminophen 325 mg oral tablet: 2 tab(s) orally every 4 hours, As needed, Temp greater or equal to 38C (100.4F), Mild Pain (1 - 3)  Aptiom: 1000 milligram(s) orally once a day (at bedtime)  dexamethasone 2 mg oral tablet: 2mg every 6 hrs x 1 day then 2mg every 12 hrs x 1 day then 1mg every 12 hrs x 1 day then 1mg every 24 hrs x 1 day   levETIRAcetam 1000 mg oral tablet: 1 tab(s) orally 2 times a day  ondansetron 4 mg oral tablet: 1 tab(s) orally every 8 hours, As Needed -for nausea MDD:3   oxycodone-acetaminophen 5 mg-325 mg oral tablet: 2 tab(s) orally every 6 hours, As Needed -for severe pain  -for moderate pain - for severe pain MDD:8   pantoprazole 40 mg oral delayed release tablet: 1 tab(s) orally once a day (before a meal)  senna oral tablet: 2 tab(s) orally once a day (at bedtime), As Needed -for chest pain - for constipation

## 2020-06-18 NOTE — PROGRESS NOTE ADULT - SUBJECTIVE AND OBJECTIVE BOX
POD#2 s/p : right crani for removal of temporal mass    PAST MEDICAL & SURGICAL HISTORY:  Brain tumor  Seizure: last activity a week ago    FAMILY HISTORY:  Family history of diabetes mellitus (DM)  FH: heart disease  FH: HTN (hypertension)    Allergies : No Known Allergies    REVIEW OF SYSTEMS  General:	  Skin/Breast:  Ophthalmologic:  ENMT:	  Respiratory and Thorax:  Cardiovascular:	  Gastrointestinal:	  Genitourinary:	  Musculoskeletal:	  Neurological:	  Psychiatric:	  Hematology/Lymphatics:	  Endocrine:	  Allergic/Immunologic:	    MEDICATIONS:  Antibiotics:    Neuro:  acetaminophen   Tablet .. 650 milliGRAM(s) Oral every 4 hours PRN  eslicarbazepine 1000 milliGRAM(s) Oral daily  levETIRAcetam 1000 milliGRAM(s) Oral two times a day  ondansetron Injectable 4 milliGRAM(s) IV Push every 6 hours PRN  oxycodone    5 mG/acetaminophen 325 mG 1 Tablet(s) Oral every 4 hours PRN    Anticoagulation:  heparin   Injectable 5000 Unit(s) SubCutaneous every 8 hours    OTHER:  chlorhexidine 4% Liquid 1 Application(s) Topical once  dexAMETHasone  Injectable   IV Push   dexAMETHasone  Injectable 2 milliGRAM(s) IV Push every 6 hours  pantoprazole    Tablet 40 milliGRAM(s) Oral before breakfast  senna 2 Tablet(s) Oral at bedtime      Vital Signs Last 24 Hrs  T(C): 36.9 (2020 05:12), Max: 37.1 (2020 15:47)  T(F): 98.4 (2020 05:12), Max: 98.7 (2020 15:47)  HR: 60 (2020 05:12) (58 - 70)  BP: 114/59 (2020 05:12) (114/59 - 128/64)  BP(mean): 90 (2020 12:00) (90 - 90)  RR: 18 (2020 05:12) (15 - 20)  SpO2: 98% (2020 15:47) (96% - 99%)    Physical Exam :  General :   A&O x 3  Tongue midline  Facial features symmetric, No droop  Speech clear and appropriate  Pt speaking in full sentences   Follows all commands   Occular :   PERRLA, no aniscoria or nystagmus  EOMI, no deviation or gaze preference   VA intact no diplopia  Motor :   MAEx4 b/l  strength 5/5 UE and LE   Shoulder shrug intact   Full ROM of neck   Sensory :  Intact bilaterally   Cerebellar :  Finger to nose intact   Pronator Drift : none appreciated     Wound : Surgical sutures in place, healing well no discharge, mild edema in surrounding area.   Dressing changed during exam.     LABS:                        12.8   12.21 )-----------( 206      ( 2020 01:37 )             37.2     06-18    142  |  106  |  10  ----------------------------<  156<H>  4.1   |  25  |  0.7    Ca    8.7      2020 01:37  Phos  2.2     06-18  Mg     1.9     06-18    TPro  5.7<L>  /  Alb  4.0  /  TBili  0.4  /  DBili  x   /  AST  20  /  ALT  27  /  AlkPhos  56  06-16    PT/INR - ( 2020 15:00 )   PT: 13.30 sec;   INR: 1.16 ratio      PTT - ( 2020 15:00 )  PTT:29.3 sec  Urinalysis Basic - ( 2020 04:25 )    Color: Colorless / Appearance: Clear / S.011 / pH: x  Gluc: x / Ketone: Negative  / Bili: Negative / Urobili: <2 mg/dL   Blood: x / Protein: Negative / Nitrite: Negative   Leuk Esterase: Negative / RBC: x / WBC x   Sq Epi: x / Non Sq Epi: x / Bacteria: x    CULTURES:      RADIOLOGY & ADDITIONAL STUDIES:  < from: MR Head w/wo IV Cont (20 @ 19:13) >    Impression:  1.  Post pterional craniotomy    2.  Postoperative changes as described    3.  T2 FLAIR signal changes and a small subcentimeter loculated cyst adjacent to the right temporal horn are stable. This may serve as a baseline for follow-up study.    4.  No residual enhancing lesion.    < end of copied text >    Assessment / Plan: S/p R pterional crani for mass resection. Pt in good spirits, completed stairs with PT denies pain, voiding and urinating. Only complaint is limited jaw extension while eating. Pt currently on normal diet, will switch to soft for comfort.   -Dressing changed today   -Soft diet for comfort  -continue Pt  -Will discuss post op MRI with Dr. Barber   -Pt on Dec taper, monitor glucose   - Will d/w Dr. Barber

## 2020-06-18 NOTE — DISCHARGE NOTE PROVIDER - NSDCACTIVITY_GEN_ALL_CORE
Walking - Indoors allowed/No heavy lifting/straining/Showering allowed/Do not make important decisions/Do not drive or operate machinery/Stairs allowed

## 2020-06-18 NOTE — DISCHARGE NOTE PROVIDER - NSDCFUADDINST_GEN_ALL_CORE_FT
Avoid directly soaking incision. If any signs / symptoms of infection  such as fever, redness, swelling, or new neurological symptoms occur, please seek reevaluation. Continue medications as prescribed and follow up with Dr. Barber in 2 weeks.

## 2020-06-24 LAB — SURGICAL PATHOLOGY STUDY: SIGNIFICANT CHANGE UP

## 2020-06-25 DIAGNOSIS — L76.81 OTHER INTRAOPERATIVE COMPLICATIONS OF SKIN AND SUBCUTANEOUS TISSUE: ICD-10-CM

## 2020-06-25 DIAGNOSIS — G93.6 CEREBRAL EDEMA: ICD-10-CM
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
## 2020-06-25 DIAGNOSIS — D43.0 NEOPLASM OF UNCERTAIN BEHAVIOR OF BRAIN, SUPRATENTORIAL: ICD-10-CM

## 2020-06-25 DIAGNOSIS — G93.9 DISORDER OF BRAIN, UNSPECIFIED: ICD-10-CM
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
## 2020-06-25 DIAGNOSIS — T31.0 BURNS INVOLVING LESS THAN 10% OF BODY SURFACE: ICD-10-CM

## 2020-06-25 DIAGNOSIS — T20.05XA BURN OF UNSPECIFIED DEGREE OF SCALP [ANY PART], INITIAL ENCOUNTER: ICD-10-CM

## 2020-06-25 DIAGNOSIS — G93.89 OTHER SPECIFIED DISORDERS OF BRAIN: ICD-10-CM

## 2020-06-25 DIAGNOSIS — G40.89 OTHER SEIZURES: ICD-10-CM

## 2020-07-01 ENCOUNTER — APPOINTMENT (OUTPATIENT)
Dept: NEUROSURGERY | Facility: CLINIC | Age: 30
End: 2020-07-01
Payer: COMMERCIAL

## 2020-07-01 PROCEDURE — 99024 POSTOP FOLLOW-UP VISIT: CPT

## 2020-07-01 NOTE — HISTORY OF PRESENT ILLNESS
[FreeTextEntry1] : Mr. Nickerson presents today accompanied by his mother, s/p right pterional craniotomy removal of mass on  6/5/2020. Doing well. Had one episode of syncope last sunday. No seizure breakthrough. Sutures remove,

## 2020-07-06 ENCOUNTER — APPOINTMENT (OUTPATIENT)
Dept: PLASTIC SURGERY | Facility: CLINIC | Age: 30
End: 2020-07-06
Payer: COMMERCIAL

## 2020-07-06 VITALS — WEIGHT: 186 LBS | BODY MASS INDEX: 27.55 KG/M2 | HEIGHT: 69 IN

## 2020-07-06 PROCEDURE — 99024 POSTOP FOLLOW-UP VISIT: CPT
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
## 2020-07-06 NOTE — PHYSICAL EXAM
[de-identified] : well-appearing, NAD [de-identified] : right scalp with healing incision, dissolvable sutures in place, 3mm partial thickness wound noted anterior to incision, superficial and healing appropriately, no s/s cellulitis
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
## 2020-07-13 ENCOUNTER — APPOINTMENT (OUTPATIENT)
Dept: NEUROSURGERY | Facility: CLINIC | Age: 30
End: 2020-07-13
Payer: COMMERCIAL

## 2020-07-13 PROCEDURE — 99024 POSTOP FOLLOW-UP VISIT: CPT

## 2020-07-13 NOTE — HISTORY OF PRESENT ILLNESS
[FreeTextEntry1] : s/p ganglioglioma resection.  He is here for wound check.  Wound clean dry and intact.\par \par No seizure activity.\par \par MRI brain +/- August 10, follow-up August 12\par \par He will follow-up routinely for wound check July 27.

## 2020-07-17 NOTE — CDI QUERY NOTE - NSCDIOTHERTXTBX_GEN_ALL_CORE_HH
Based on medical judgment, clinical indicators and treatment please document the clinical significance on Radiology finding      Brain Compression      Clinically insignificant  finding on radiology report      Other (please specify:      Clinically unable to determine.    CLINICAL INDICATORS:    MRI: 4/27/2020:  right temporal lobe lesion 2.1 x 1.8 x 1.6 cm (unchanged when compared with the prior study when measured at the same level), anterior portion of the lesion predominately solidly enhancing measuring 1.1 x 1.2 x 1.4 cm (unchanged when compared with the prior study when measured at the same level). There is a subtle thin rim of enhancement in the posterior portion of the lesion. There is mild mass effect upon the right temporal horn.  Op report indicator:  The patient is a 30-yo male with a history of intractable seizures despite high dose of medication. Seizure located to the right temporal lobe. MRI demonstrated a right temporal lobe mass with enhancement.     cont Keppra, Decadron taper      Thank you for your time and attention to this matter!

## 2020-07-29 ENCOUNTER — APPOINTMENT (OUTPATIENT)
Dept: NEUROSURGERY | Facility: CLINIC | Age: 30
End: 2020-07-29
Payer: COMMERCIAL

## 2020-07-29 DIAGNOSIS — Z48.89 ENCOUNTER FOR OTHER SPECIFIED SURGICAL AFTERCARE: ICD-10-CM

## 2020-07-29 PROCEDURE — 99024 POSTOP FOLLOW-UP VISIT: CPT

## 2020-07-29 NOTE — HISTORY OF PRESENT ILLNESS
[FreeTextEntry1] : Mr. Nickerson presents today accompanied by his mother for a wound check, he is s/p right crani for ganglioglioma resection on 6/16. Surgical site is healed. Denies fever and chills.

## 2020-08-08 ENCOUNTER — RESULT REVIEW (OUTPATIENT)
Age: 30
End: 2020-08-08

## 2020-08-08 ENCOUNTER — OUTPATIENT (OUTPATIENT)
Dept: OUTPATIENT SERVICES | Facility: HOSPITAL | Age: 30
LOS: 1 days | Discharge: HOME | End: 2020-08-08
Payer: COMMERCIAL

## 2020-08-08 DIAGNOSIS — G93.89 OTHER SPECIFIED DISORDERS OF BRAIN: ICD-10-CM

## 2020-08-08 PROCEDURE — 70553 MRI BRAIN STEM W/O & W/DYE: CPT | Mod: 26,76

## 2020-08-12 ENCOUNTER — APPOINTMENT (OUTPATIENT)
Dept: NEUROSURGERY | Facility: CLINIC | Age: 30
End: 2020-08-12
Payer: COMMERCIAL

## 2020-08-12 VITALS — BODY MASS INDEX: 27.99 KG/M2 | WEIGHT: 189 LBS | HEIGHT: 69 IN

## 2020-08-12 DIAGNOSIS — G93.89 OTHER SPECIFIED DISORDERS OF BRAIN: ICD-10-CM

## 2020-08-12 PROCEDURE — 99024 POSTOP FOLLOW-UP VISIT: CPT

## 2020-08-12 NOTE — HISTORY OF PRESENT ILLNESS
[FreeTextEntry1] : s/p ganglioglioma resection.  Doing well.  No seizure activity.  Wound healed.  \par \par MRI - postop changes\par \par Repeat MRI in 3 months then return to office\par will start weaning off antiepileptic meds depending on MRI results

## 2020-11-04 ENCOUNTER — RESULT REVIEW (OUTPATIENT)
Age: 30
End: 2020-11-04

## 2020-11-04 ENCOUNTER — OUTPATIENT (OUTPATIENT)
Dept: OUTPATIENT SERVICES | Facility: HOSPITAL | Age: 30
LOS: 1 days | Discharge: HOME | End: 2020-11-04
Payer: COMMERCIAL

## 2020-11-04 DIAGNOSIS — G93.89 OTHER SPECIFIED DISORDERS OF BRAIN: ICD-10-CM

## 2020-11-04 PROCEDURE — 70553 MRI BRAIN STEM W/O & W/DYE: CPT | Mod: 26

## 2020-11-11 ENCOUNTER — APPOINTMENT (OUTPATIENT)
Dept: NEUROSURGERY | Facility: CLINIC | Age: 30
End: 2020-11-11
Payer: COMMERCIAL

## 2020-11-11 VITALS — BODY MASS INDEX: 27.99 KG/M2 | HEIGHT: 69 IN | WEIGHT: 189 LBS

## 2020-11-11 PROCEDURE — 99214 OFFICE O/P EST MOD 30 MIN: CPT

## 2020-11-11 PROCEDURE — 99072 ADDL SUPL MATRL&STAF TM PHE: CPT

## 2020-11-11 RX ORDER — LEVETIRACETAM 1000 MG/1
1000 TABLET, FILM COATED ORAL TWICE DAILY
Qty: 60 | Refills: 5 | Status: DISCONTINUED | COMMUNITY
Start: 2020-07-29 | End: 2020-11-11

## 2020-11-12 NOTE — ASSESSMENT
[FreeTextEntry1] : Mr. Nickerson is doing well. He will continue to follow up with his general neurologist as scheduled. I would like a repeat MRI brain w/wo james in 6 months. I will see him back after that MRI is completed. He will call barring any issues.

## 2020-11-12 NOTE — HISTORY OF PRESENT ILLNESS
[FreeTextEntry1] : Mr. Nickerson is doing well s/p ganglioglioma resection. Recent MRI brain w/wo gado from 11/4/2020 showed improvement of postop changes, no enhancement, good resection of lesion. Patient denies seizures. He is under the care of Dr. Majano, his neurologist. He is in process of weaning off his antiepileptics.

## 2021-05-04 ENCOUNTER — OUTPATIENT (OUTPATIENT)
Dept: OUTPATIENT SERVICES | Facility: HOSPITAL | Age: 31
LOS: 1 days | Discharge: HOME | End: 2021-05-04
Payer: COMMERCIAL

## 2021-05-04 ENCOUNTER — RESULT REVIEW (OUTPATIENT)
Age: 31
End: 2021-05-04

## 2021-05-04 DIAGNOSIS — D49.6 NEOPLASM OF UNSPECIFIED BEHAVIOR OF BRAIN: ICD-10-CM

## 2021-05-04 DIAGNOSIS — R51.9 HEADACHE, UNSPECIFIED: ICD-10-CM

## 2021-05-04 PROCEDURE — 70553 MRI BRAIN STEM W/O & W/DYE: CPT | Mod: 26

## 2021-05-12 ENCOUNTER — APPOINTMENT (OUTPATIENT)
Dept: NEUROSURGERY | Facility: CLINIC | Age: 31
End: 2021-05-12
Payer: COMMERCIAL

## 2021-05-12 VITALS — HEIGHT: 69 IN | WEIGHT: 206 LBS | BODY MASS INDEX: 30.51 KG/M2

## 2021-05-12 PROCEDURE — 99213 OFFICE O/P EST LOW 20 MIN: CPT

## 2021-05-12 PROCEDURE — 99072 ADDL SUPL MATRL&STAF TM PHE: CPT

## 2021-05-13 NOTE — HISTORY OF PRESENT ILLNESS
[FreeTextEntry1] : Kulwinder is 1 year s/p right temporal craniotomy resection of ganglioglioma.  Over the past year he has been weaning off his seizure medication.  At 800mg per day of ______ he is seizure free.  However, he believes he may have had a seizure below this dose.\par \par MRI shows resection of the tumor.\par \par I discussed with him that he may be a candidate for SHELDON.  He will work with his neurologist and consider this if he is unable to wean off the seizure medication.  Otherwise MRI in one year with follow-up.

## 2021-06-11 NOTE — PHYSICAL THERAPY INITIAL EVALUATION ADULT - ASSISTIVE DEVICE FOR TRANSFER: STAND/SIT, REHAB EVAL
rolling walker Azithromycin Pregnancy And Lactation Text: This medication is considered safe during pregnancy and is also secreted in breast milk.

## 2022-05-07 ENCOUNTER — RESULT REVIEW (OUTPATIENT)
Age: 32
End: 2022-05-07

## 2022-05-07 ENCOUNTER — OUTPATIENT (OUTPATIENT)
Dept: OUTPATIENT SERVICES | Facility: HOSPITAL | Age: 32
LOS: 1 days | Discharge: HOME | End: 2022-05-07
Payer: COMMERCIAL

## 2022-05-07 DIAGNOSIS — D49.6 NEOPLASM OF UNSPECIFIED BEHAVIOR OF BRAIN: ICD-10-CM

## 2022-05-07 PROCEDURE — 70553 MRI BRAIN STEM W/O & W/DYE: CPT | Mod: 26

## 2022-05-18 ENCOUNTER — APPOINTMENT (OUTPATIENT)
Dept: NEUROSURGERY | Facility: CLINIC | Age: 32
End: 2022-05-18
Payer: COMMERCIAL

## 2022-05-18 DIAGNOSIS — D49.6 NEOPLASM OF UNSPECIFIED BEHAVIOR OF BRAIN: ICD-10-CM

## 2022-05-18 DIAGNOSIS — G40.109 LOCALIZATION-RELATED (FOCAL) (PARTIAL) SYMPTOMATIC EPILEPSY AND EPILEPTIC SYNDROMES WITH SIMPLE PARTIAL SEIZURES, NOT INTRACTABLE, W/OUT STATUS EPILEPTICUS: ICD-10-CM

## 2022-05-18 PROCEDURE — 99212 OFFICE O/P EST SF 10 MIN: CPT

## 2022-05-18 RX ORDER — CARBAMAZEPINE 200 MG/1
200 TABLET ORAL TWICE DAILY
Qty: 120 | Refills: 3 | Status: DISCONTINUED | COMMUNITY
Start: 2020-03-19 | End: 2022-05-18

## 2022-05-18 NOTE — HISTORY OF PRESENT ILLNESS
[FreeTextEntry1] : Briefly, Mr. Nickerson hx of right craniotomy for ganglioglioma and seizure, presents today to review surveillance brain MRI. \par \par Today he reports of having 3 seizure a month which started two months ago. They consistent of: feeling of "aura" lasting 30 sec, and one episode of "staring off" and coming out of it confused, lasting one min. \par His neurologist Dr. Elise Majano is aware of this- is current med is : Keppra 500mg am, 250mg qhs ( from 250am/250qhs)\par \par He also stated his eyes blink rapidly, but he is unsure if its a bad habit vs. anxiety vs. seizure , which also started 2months ago. \par \par MRI brain w/w/o contrast done on 5/2022 at [facility]- showed stable appearance, no enhancement.

## 2022-11-23 ENCOUNTER — APPOINTMENT (OUTPATIENT)
Dept: ORTHOPEDIC SURGERY | Facility: CLINIC | Age: 32
End: 2022-11-23

## 2022-11-23 VITALS — BODY MASS INDEX: 32.95 KG/M2 | HEIGHT: 66 IN | WEIGHT: 205 LBS

## 2022-11-23 PROCEDURE — 99203 OFFICE O/P NEW LOW 30 MIN: CPT

## 2022-11-23 NOTE — HISTORY OF PRESENT ILLNESS
[de-identified] : Patient is a 32-year-old male here for evaluation of his right ring finger.  He states that on 11/20/2022, he was playing softball when he jammed his finger on a ball.  He went to an urgent care and was diagnosed with a fracture.

## 2022-11-23 NOTE — DISCUSSION/SUMMARY
[de-identified] :   I discontinued the Alumafoam splint.  I galileo-taped his 4th and 5th digits.\par I recommend he work on gentle range of motion.\par I will see him back in 1 month for repeat x-ray and range of motion check.\par All questions were answered today.
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
## 2022-11-23 NOTE — IMAGING
[de-identified] :   Physical exam his right ring finger:  Swelling of the digit.  Negative ecchymosis.  Nontender over the MCP joint.  Pain over the PIP joint.  Nontender over the DIP joint.  He can almost make a full fist.  He can flex and extend the MCP, PIP, and DIP joint of all 10 digits.  His sensory and motor are intact.

## 2022-12-21 ENCOUNTER — APPOINTMENT (OUTPATIENT)
Dept: ORTHOPEDIC SURGERY | Facility: CLINIC | Age: 32
End: 2022-12-21

## 2022-12-21 VITALS — BODY MASS INDEX: 32.95 KG/M2 | HEIGHT: 66 IN | WEIGHT: 205 LBS

## 2022-12-21 DIAGNOSIS — S62.624A DISPLACED FRACTURE OF MIDDLE PHALANX OF RIGHT RING FINGER, INITIAL ENCOUNTER FOR CLOSED FRACTURE: ICD-10-CM

## 2022-12-21 PROCEDURE — 73140 X-RAY EXAM OF FINGER(S): CPT | Mod: RT

## 2022-12-21 NOTE — PHYSICAL EXAM
[de-identified] : Physical exam of his right ring finger. Mild swelling of the PIP joint. Negative ecchymosis. Good ROM of the finger. Sensory and motor are intact.

## 2022-12-21 NOTE — HISTORY OF PRESENT ILLNESS
[de-identified] : Patient is a 32-year-old male here for repeat eval of his right ring finger.  He is about a month status post his injury. He is feeling better.

## 2022-12-21 NOTE — DISCUSSION/SUMMARY
[de-identified] : He is 1 month status post the injury.\par He understands these injuries take 4-6 weeks to heal. Swelling can linger for several months, \par He will continue to work on range of motion of the hand. \par Follow up prn. \par All questions were answered today.

## 2022-12-21 NOTE — DATA REVIEWED
[FreeTextEntry1] : X-rays repeated in the office today of his right ring finger show a healing volar avulsion fracture of the middle phalanx.

## 2022-12-21 NOTE — IMAGING
[Well Developed] : well developed [de-identified] :   Physical exam his right ring finger:  Swelling of the digit.  Negative ecchymosis.  Nontender over the MCP joint.  Pain over the PIP joint.  Nontender over the DIP joint.  He can almost make a full fist.  He can flex and extend the MCP, PIP, and DIP joint of all 10 digits.  His sensory and motor are intact. [Well Nourished] : well nourished [No Acute Distress] : no acute distress [Normal Conjunctiva] : normal conjunctiva [Normal Venous Pressure] : normal venous pressure [No Carotid Bruit] : no carotid bruit [Normal S1, S2] : normal S1, S2 [No Murmur] : no murmur [No Rub] : no rub [No Gallop] : no gallop [Clear Lung Fields] : clear lung fields [Good Air Entry] : good air entry [No Respiratory Distress] : no respiratory distress  [Soft] : abdomen soft [Non Tender] : non-tender [No Masses/organomegaly] : no masses/organomegaly [Normal Bowel Sounds] : normal bowel sounds [Normal Gait] : normal gait [No Edema] : no edema [No Cyanosis] : no cyanosis [No Clubbing] : no clubbing [No Varicosities] : no varicosities [No Rash] : no rash [No Skin Lesions] : no skin lesions [Moves all extremities] : moves all extremities [No Focal Deficits] : no focal deficits [Normal Speech] : normal speech [Alert and Oriented] : alert and oriented [Normal memory] : normal memory

## 2023-02-19 ENCOUNTER — OUTPATIENT (OUTPATIENT)
Dept: OUTPATIENT SERVICES | Facility: HOSPITAL | Age: 33
LOS: 1 days | End: 2023-02-19
Payer: COMMERCIAL

## 2023-02-19 DIAGNOSIS — Z00.8 ENCOUNTER FOR OTHER GENERAL EXAMINATION: ICD-10-CM

## 2023-02-19 DIAGNOSIS — R51.9 HEADACHE, UNSPECIFIED: ICD-10-CM

## 2023-02-19 PROCEDURE — 70553 MRI BRAIN STEM W/O & W/DYE: CPT | Mod: 26

## 2023-02-19 PROCEDURE — 70553 MRI BRAIN STEM W/O & W/DYE: CPT

## 2023-02-19 PROCEDURE — A9579: CPT

## 2023-02-20 DIAGNOSIS — R51.9 HEADACHE, UNSPECIFIED: ICD-10-CM

## 2023-07-12 NOTE — PRE-ANESTHESIA EVALUATION ADULT - WEIGHT IN KG
-Please follow up with  ___on ___.  The office is located at [facility], [facility], 4th floor. Call us with any questions, #[phone].    Please follow up with Dr. Mitul Gonzalez on    -Walk daily as tolerated and use your incentive spirometer every hour.    -No driving or strenuous activity/exercise for 6 weeks, or until cleared by your surgeon.    -You may shower.  Be sure to gently clean your incisions with anti-bacterial soap and water daily, pat dry.  You may leave them open to air.    -Call your doctor if you have shortness of breath, chest pain not relieved by pain medication, dizziness, fever >101.5, or increased redness or drainage from incisions.  86 -Please follow up with Dr. Cervantes in 1 week. Our office will call you with an appointment.   The office is located at [facility], [facility], 4th floor. Call us with any questions, #[phone].    Please follow up with Dr. Mitul Gonzalez in 1-2 weeks. Our office will call you with an appointment.     -Walk daily as tolerated and use your incentive spirometer every hour.    -No driving or strenuous activity/exercise for 6 weeks, or until cleared by your surgeon.    -You may shower.  Be sure to gently clean your incisions with anti-bacterial soap and water daily, pat dry.  You may leave them open to air.    -Call your doctor if you have shortness of breath, chest pain not relieved by pain medication, dizziness, fever >101.5, or increased redness or drainage from incisions.

## 2023-12-04 ENCOUNTER — NON-APPOINTMENT (OUTPATIENT)
Age: 33
End: 2023-12-04

## 2024-01-26 NOTE — PROGRESS NOTE ADULT - WEIGHT IN LBS
Unable to reach patient for one month post discharge follow up call.   LVM with call back number for patient to call if needed       
200.8

## 2024-03-23 ENCOUNTER — OUTPATIENT (OUTPATIENT)
Dept: OUTPATIENT SERVICES | Facility: HOSPITAL | Age: 34
LOS: 1 days | End: 2024-03-23
Payer: COMMERCIAL

## 2024-03-23 DIAGNOSIS — G40.009 LOCALIZATION-RELATED (FOCAL) (PARTIAL) IDIOPATHIC EPILEPSY AND EPILEPTIC SYNDROMES WITH SEIZURES OF LOCALIZED ONSET, NOT INTRACTABLE, WITHOUT STATUS EPILEPTICUS: ICD-10-CM

## 2024-03-23 PROCEDURE — 70553 MRI BRAIN STEM W/O & W/DYE: CPT

## 2024-03-23 PROCEDURE — 70553 MRI BRAIN STEM W/O & W/DYE: CPT | Mod: 26

## 2024-03-23 PROCEDURE — A9579: CPT

## 2024-03-24 DIAGNOSIS — G40.009 LOCALIZATION-RELATED (FOCAL) (PARTIAL) IDIOPATHIC EPILEPSY AND EPILEPTIC SYNDROMES WITH SEIZURES OF LOCALIZED ONSET, NOT INTRACTABLE, WITHOUT STATUS EPILEPTICUS: ICD-10-CM

## 2025-02-02 ENCOUNTER — OUTPATIENT (OUTPATIENT)
Dept: OUTPATIENT SERVICES | Facility: HOSPITAL | Age: 35
LOS: 1 days | End: 2025-02-02
Payer: COMMERCIAL

## 2025-02-02 DIAGNOSIS — D49.6 NEOPLASM OF UNSPECIFIED BEHAVIOR OF BRAIN: ICD-10-CM

## 2025-02-02 PROCEDURE — A9579: CPT

## 2025-02-02 PROCEDURE — 70553 MRI BRAIN STEM W/O & W/DYE: CPT | Mod: 26

## 2025-02-02 PROCEDURE — 70553 MRI BRAIN STEM W/O & W/DYE: CPT

## 2025-02-03 DIAGNOSIS — D49.6 NEOPLASM OF UNSPECIFIED BEHAVIOR OF BRAIN: ICD-10-CM
